# Patient Record
Sex: MALE | Race: WHITE | NOT HISPANIC OR LATINO
[De-identification: names, ages, dates, MRNs, and addresses within clinical notes are randomized per-mention and may not be internally consistent; named-entity substitution may affect disease eponyms.]

---

## 2017-01-25 ENCOUNTER — APPOINTMENT (OUTPATIENT)
Dept: INTERNAL MEDICINE | Facility: CLINIC | Age: 72
End: 2017-01-25

## 2017-01-25 VITALS
TEMPERATURE: 98.1 F | WEIGHT: 238 LBS | OXYGEN SATURATION: 98 % | BODY MASS INDEX: 29.59 KG/M2 | DIASTOLIC BLOOD PRESSURE: 76 MMHG | HEART RATE: 57 BPM | HEIGHT: 75 IN | SYSTOLIC BLOOD PRESSURE: 130 MMHG

## 2017-03-21 ENCOUNTER — RX RENEWAL (OUTPATIENT)
Age: 72
End: 2017-03-21

## 2017-03-24 ENCOUNTER — APPOINTMENT (OUTPATIENT)
Dept: OTOLARYNGOLOGY | Facility: CLINIC | Age: 72
End: 2017-03-24

## 2017-03-24 VITALS
BODY MASS INDEX: 29.59 KG/M2 | HEART RATE: 81 BPM | WEIGHT: 238 LBS | DIASTOLIC BLOOD PRESSURE: 87 MMHG | HEIGHT: 75 IN | SYSTOLIC BLOOD PRESSURE: 154 MMHG

## 2017-04-21 ENCOUNTER — APPOINTMENT (OUTPATIENT)
Dept: OTOLARYNGOLOGY | Facility: CLINIC | Age: 72
End: 2017-04-21

## 2017-04-21 VITALS
HEART RATE: 67 BPM | HEIGHT: 75 IN | BODY MASS INDEX: 29.59 KG/M2 | DIASTOLIC BLOOD PRESSURE: 71 MMHG | WEIGHT: 238 LBS | SYSTOLIC BLOOD PRESSURE: 144 MMHG

## 2017-05-15 ENCOUNTER — APPOINTMENT (OUTPATIENT)
Dept: OTOLARYNGOLOGY | Facility: CLINIC | Age: 72
End: 2017-05-15

## 2017-05-15 VITALS
HEART RATE: 73 BPM | SYSTOLIC BLOOD PRESSURE: 156 MMHG | BODY MASS INDEX: 29.59 KG/M2 | WEIGHT: 238 LBS | DIASTOLIC BLOOD PRESSURE: 78 MMHG | HEIGHT: 75 IN

## 2017-06-07 ENCOUNTER — APPOINTMENT (OUTPATIENT)
Dept: OTOLARYNGOLOGY | Facility: CLINIC | Age: 72
End: 2017-06-07

## 2017-06-07 VITALS
BODY MASS INDEX: 29.59 KG/M2 | SYSTOLIC BLOOD PRESSURE: 135 MMHG | WEIGHT: 238 LBS | HEART RATE: 90 BPM | HEIGHT: 75 IN | DIASTOLIC BLOOD PRESSURE: 77 MMHG

## 2017-10-14 ENCOUNTER — EMERGENCY (EMERGENCY)
Facility: HOSPITAL | Age: 72
LOS: 1 days | Discharge: HOME | End: 2017-10-14
Attending: EMERGENCY MEDICINE | Admitting: EMERGENCY MEDICINE
Payer: MEDICARE

## 2017-10-14 VITALS
HEART RATE: 82 BPM | SYSTOLIC BLOOD PRESSURE: 179 MMHG | DIASTOLIC BLOOD PRESSURE: 78 MMHG | RESPIRATION RATE: 20 BRPM | OXYGEN SATURATION: 97 % | TEMPERATURE: 98 F

## 2017-10-14 VITALS
SYSTOLIC BLOOD PRESSURE: 150 MMHG | HEART RATE: 62 BPM | TEMPERATURE: 98 F | DIASTOLIC BLOOD PRESSURE: 82 MMHG | RESPIRATION RATE: 17 BRPM | OXYGEN SATURATION: 98 %

## 2017-10-14 DIAGNOSIS — Q62.10 CONGENITAL OCCLUSION OF URETER, UNSPECIFIED: Chronic | ICD-10-CM

## 2017-10-14 LAB
ALBUMIN SERPL ELPH-MCNC: 4.4 G/DL — SIGNIFICANT CHANGE UP (ref 3.3–5)
ALP SERPL-CCNC: 99 U/L — SIGNIFICANT CHANGE UP (ref 40–120)
ALT FLD-CCNC: 25 U/L RC — SIGNIFICANT CHANGE UP (ref 10–45)
ANION GAP SERPL CALC-SCNC: 15 MMOL/L — SIGNIFICANT CHANGE UP (ref 5–17)
APPEARANCE UR: CLEAR — SIGNIFICANT CHANGE UP
AST SERPL-CCNC: 20 U/L — SIGNIFICANT CHANGE UP (ref 10–40)
BASOPHILS # BLD AUTO: 0 K/UL — SIGNIFICANT CHANGE UP (ref 0–0.2)
BILIRUB SERPL-MCNC: 1.4 MG/DL — HIGH (ref 0.2–1.2)
BILIRUB UR-MCNC: NEGATIVE — SIGNIFICANT CHANGE UP
BUN SERPL-MCNC: 25 MG/DL — HIGH (ref 7–23)
CALCIUM SERPL-MCNC: 9.6 MG/DL — SIGNIFICANT CHANGE UP (ref 8.4–10.5)
CHLORIDE SERPL-SCNC: 98 MMOL/L — SIGNIFICANT CHANGE UP (ref 96–108)
CO2 SERPL-SCNC: 24 MMOL/L — SIGNIFICANT CHANGE UP (ref 22–31)
COLOR SPEC: YELLOW — SIGNIFICANT CHANGE UP
CREAT SERPL-MCNC: 2.06 MG/DL — HIGH (ref 0.5–1.3)
DIFF PNL FLD: ABNORMAL
EOSINOPHIL # BLD AUTO: 0.1 K/UL — SIGNIFICANT CHANGE UP (ref 0–0.5)
GAS PNL BLDV: SIGNIFICANT CHANGE UP
GLUCOSE SERPL-MCNC: 248 MG/DL — HIGH (ref 70–99)
GLUCOSE UR QL: >1000
HCT VFR BLD CALC: 45 % — SIGNIFICANT CHANGE UP (ref 39–50)
HGB BLD-MCNC: 16.6 G/DL — SIGNIFICANT CHANGE UP (ref 13–17)
KETONES UR-MCNC: NEGATIVE — SIGNIFICANT CHANGE UP
LEUKOCYTE ESTERASE UR-ACNC: NEGATIVE — SIGNIFICANT CHANGE UP
LYMPHOCYTES # BLD AUTO: 1.4 K/UL — SIGNIFICANT CHANGE UP (ref 1–3.3)
LYMPHOCYTES # BLD AUTO: 9 % — LOW (ref 13–44)
MCHC RBC-ENTMCNC: 33.1 PG — SIGNIFICANT CHANGE UP (ref 27–34)
MCHC RBC-ENTMCNC: 36.8 GM/DL — HIGH (ref 32–36)
MCV RBC AUTO: 89.9 FL — SIGNIFICANT CHANGE UP (ref 80–100)
MONOCYTES # BLD AUTO: 0.6 K/UL — SIGNIFICANT CHANGE UP (ref 0–0.9)
MONOCYTES NFR BLD AUTO: 5 % — SIGNIFICANT CHANGE UP (ref 2–14)
NEUTROPHILS # BLD AUTO: 10.1 K/UL — HIGH (ref 1.8–7.4)
NEUTROPHILS NFR BLD AUTO: 86 % — HIGH (ref 43–77)
NITRITE UR-MCNC: NEGATIVE — SIGNIFICANT CHANGE UP
PH UR: 5.5 — SIGNIFICANT CHANGE UP (ref 5–8)
PLATELET # BLD AUTO: 142 K/UL — LOW (ref 150–400)
POTASSIUM SERPL-MCNC: 4.3 MMOL/L — SIGNIFICANT CHANGE UP (ref 3.5–5.3)
POTASSIUM SERPL-SCNC: 4.3 MMOL/L — SIGNIFICANT CHANGE UP (ref 3.5–5.3)
PROT SERPL-MCNC: 8 G/DL — SIGNIFICANT CHANGE UP (ref 6–8.3)
PROT UR-MCNC: 30 MG/DL
RBC # BLD: 5 M/UL — SIGNIFICANT CHANGE UP (ref 4.2–5.8)
RBC # FLD: 11.4 % — SIGNIFICANT CHANGE UP (ref 10.3–14.5)
RBC CASTS # UR COMP ASSIST: ABNORMAL /HPF (ref 0–2)
SODIUM SERPL-SCNC: 137 MMOL/L — SIGNIFICANT CHANGE UP (ref 135–145)
SP GR SPEC: 1.02 — SIGNIFICANT CHANGE UP (ref 1.01–1.02)
UROBILINOGEN FLD QL: NEGATIVE — SIGNIFICANT CHANGE UP
WBC # BLD: 12.1 K/UL — HIGH (ref 3.8–10.5)
WBC # FLD AUTO: 12.1 K/UL — HIGH (ref 3.8–10.5)
WBC UR QL: SIGNIFICANT CHANGE UP /HPF (ref 0–5)

## 2017-10-14 PROCEDURE — 74176 CT ABD & PELVIS W/O CONTRAST: CPT | Mod: 26

## 2017-10-14 PROCEDURE — 99284 EMERGENCY DEPT VISIT MOD MDM: CPT | Mod: GC

## 2017-10-14 RX ORDER — CEPHALEXIN 500 MG
1 CAPSULE ORAL
Qty: 28 | Refills: 0 | OUTPATIENT
Start: 2017-10-14 | End: 2017-10-21

## 2017-10-14 RX ORDER — OXYCODONE HYDROCHLORIDE 5 MG/1
1 TABLET ORAL
Qty: 8 | Refills: 0 | OUTPATIENT
Start: 2017-10-14 | End: 2017-10-16

## 2017-10-14 RX ORDER — CEPHALEXIN 500 MG
500 CAPSULE ORAL ONCE
Qty: 0 | Refills: 0 | Status: COMPLETED | OUTPATIENT
Start: 2017-10-14 | End: 2017-10-14

## 2017-10-14 RX ORDER — OXYCODONE HYDROCHLORIDE 5 MG/1
5 TABLET ORAL ONCE
Qty: 0 | Refills: 0 | Status: DISCONTINUED | OUTPATIENT
Start: 2017-10-14 | End: 2017-10-14

## 2017-10-14 RX ORDER — SODIUM CHLORIDE 9 MG/ML
1000 INJECTION INTRAMUSCULAR; INTRAVENOUS; SUBCUTANEOUS ONCE
Qty: 0 | Refills: 0 | Status: COMPLETED | OUTPATIENT
Start: 2017-10-14 | End: 2017-10-14

## 2017-10-14 RX ORDER — ACETAMINOPHEN 500 MG
975 TABLET ORAL ONCE
Qty: 0 | Refills: 0 | Status: COMPLETED | OUTPATIENT
Start: 2017-10-14 | End: 2017-10-14

## 2017-10-14 RX ADMIN — OXYCODONE HYDROCHLORIDE 5 MILLIGRAM(S): 5 TABLET ORAL at 05:37

## 2017-10-14 RX ADMIN — OXYCODONE HYDROCHLORIDE 5 MILLIGRAM(S): 5 TABLET ORAL at 02:00

## 2017-10-14 RX ADMIN — SODIUM CHLORIDE 4000 MILLILITER(S): 9 INJECTION INTRAMUSCULAR; INTRAVENOUS; SUBCUTANEOUS at 02:03

## 2017-10-14 RX ADMIN — Medication 500 MILLIGRAM(S): at 05:48

## 2017-10-14 RX ADMIN — Medication 975 MILLIGRAM(S): at 01:58

## 2017-10-14 RX ADMIN — Medication 975 MILLIGRAM(S): at 05:37

## 2017-10-14 NOTE — ED PROVIDER NOTE - RESPIRATORY, MLM
Breath sounds clear and equal bilaterally. **ATTENDING ADDENDUM (Dr. Kendell Vega): NO wheezing, rales, rhonchi, crackles, stridor, drooling, retractions, nasal flaring, or tripoding.

## 2017-10-14 NOTE — ED PROVIDER NOTE - MEDICAL DECISION MAKING DETAILS
Likely another kidney stone, nontoxic appearing, no signs of systemic infection. Plan: ua with culture, ct stone hunt, pain control, labs Likely another kidney stone, nontoxic appearing, no signs of systemic infection. Plan: ua with culture, ct stone hunt, pain control, labs  **ATTENDING MEDICAL DECISION MAKING/SYNTHESIS (Dr. Kendell Vega): I have reviewed the Chief Complaint, the HPI, the ROS, and have directly performed and confirmed the findings on the Physical Examination. I have reviewed the medical decision making with all providers, as applicable. The PROBLEM REPRESENTATION at this time is: 71-year-old man with prior history of obstructing ureteral stones now presenting with two days of acute left-sided flank pain (dull, achy) and nausea. The MOST LIKELY DIAGNOSIS, and the LIST OF DIFFERENTIAL DIAGNOSES, includes (but is not limited to) the following: obstructing ureteral stone with or without pyelonephritis, serious bacterial infection or sepsis/severe sepsis e.g. urinary tract infection or pyelonephritis with or without obstructing ureteral stone, vascular etiology e.g. AAA, dissection, or equivalent, surgical etiology for pain e.g. small bowel obstruction,. large bowel obstruction, diverticulitis, perforation, peritonitis, or equivalent, electrolyte-metabolic-endocrine derangements, dehydration. The likelihood of each of these diagnoses has been appropriately considered in the context of this patient's presentation and my evaluation. PLAN: as described in EMR, including diagnostics, therapeutics and consultation as clinically warranted. I will continue to reevaluate the patient, including the results of all testing, and monitor response to therapy throughout the patient's course in the ED.

## 2017-10-14 NOTE — ED PROVIDER NOTE - OBJECTIVE STATEMENT
Patient is 71 y M with PMH IDDM2 on metformin, htn, multiple kidney stones s/p lithotripsy, melanoma in remission presenting with dull constant left flank pain x 2 days consistent with previous kidney stones, has been having trouble sleeping, some nausea no vomiting.     PMD: Marck Hernandez  Uro: Kendell Metz  ROS: Denies fever, palpitations, chills, recent sickness, HA, vision changes, cough, SOB, chest pain, n/v/d/c, dysuria, hematuria, rash, new joint aches, sick contacts, and recent travel. Patient is 71 y M with PMH IDDM2 on metformin, htn, multiple kidney stones s/p lithotripsy, melanoma in remission presenting with dull constant left flank pain x 2 days consistent with previous kidney stones, has been having trouble sleeping, some nausea no vomiting.   PMD: Marck Hernandez  Uro: Kendell Metz  ROS: Denies fever, palpitations, chills, recent sickness, HA, vision changes, cough, SOB, chest pain, n/v/d/c, dysuria, hematuria, rash, new joint aches, sick contacts, and recent travel.  **ATTENDING ADDENDUM (Dr. Kendell Vega): I attest that I have directly and personally interviewed and examined this patient and elicited a comparable history of present illness and review of systems as documented, along with my EM resident. I attest that I have made significant contributions to the documentation where necessary and as noted in the EMR.

## 2017-10-14 NOTE — ED PROVIDER NOTE - ATTENDING CONTRIBUTION TO CARE
**ATTENDING ADDENDUM (Dr. Kendell Vega): I attest that I have directly examined this patient and reviewed and formulated the diagnostic and therapeutic management plan in collaboration with the EM resident. Please see MDM note and remainder of EMR for findings from CC, HPI, ROS, and PE. (Omari)

## 2017-10-14 NOTE — ED ADULT NURSE NOTE - OBJECTIVE STATEMENT
70 y/o male hx of DM, HTN and kidney stones came in c/o left flank pain 7/10 pain scale. Pt denies chest pain or SOB, no nausea or vomiting, no fever/chills. Abdomen is rigid which is baseline as per pt. non tender t touch. Denies any urinary symptoms. Safety maintained & continue monitor.

## 2017-10-14 NOTE — CONSULT NOTE ADULT - SUBJECTIVE AND OBJECTIVE BOX
Urology PA Consult Note    HPI:    71 y.o. M with PMHx of nephrolithiasis s/p lithotripsy and stent placement, DM presenting with sudden onset of dull, constant L flank pan x 2 days. Pt reports discomfort is similar to previous kidney stones, though states pain is dull and not as intense. Pt admits to nausea but denies fevers/chills, vomiting, hematuria, dysuria, urinary urgency/frequency, chest pain or SOB.      In ED pt paint controlled after receiving 975mg oral tylenol and 5mg oxycodone.      PAST MEDICAL & SURGICAL HISTORY:  Kidney stone  Hypertension  History of Renal Calculi  History of Melanoma  NIDDM (Non-Insulin Dependent Diabetes Mellitus)  Ureteral stenosis: with stent placement  Nephrolithiasis: stent placement      MEDICATIONS  (STANDING):    MEDICATIONS  (PRN):    FAMILY HISTORY:    Allergies  glipiZIDE (Unknown)    Intolerances    REVIEW OF SYSTEMS: Pertinent positives and negatives as stated in HPI, otherwise negative    Vital signs  T(C): 37.1 (10-14-17 @ 02:27), Max: 37.1 (10-14-17 @ 02:27)  HR: 59 (10-14-17 @ 03:56)  BP: 146/77 (10-14-17 @ 03:56)  SpO2: 97% (10-14-17 @ 03:56)  Wt(kg): --    Output  I&O's Summary    UOP    Physical Exam  Gen: NAD, A&O x 3  Abd: obese, soft, non tender  Back: No CVA tenderness b/l  : voiding, wnl  Ext: No edema present b/l    LABS:  10-14 @ 01:56    WBC 12.1  / Hct 45.0  / SCr 2.06     10-14    137  |  98  |  25<H>  ----------------------------<  248<H>  4.3   |  24  |  2.06<H>    Ca    9.6      14 Oct 2017 01:56    TPro  8.0  /  Alb  4.4  /  TBili  1.4<H>  /  DBili  x   /  AST  20  /  ALT  25  /  AlkPhos  99  10-14      Urinalysis Basic - ( 14 Oct 2017 02:14 )    Color: Yellow / Appearance: Clear / S.017 / pH: x  Gluc: x / Ketone: Negative  / Bili: Negative / Urobili: Negative   Blood: x / Protein: 30 mg/dL / Nitrite: Negative   Leuk Esterase: Negative / RBC: 25-50 /HPF / WBC 3-5 /HPF   Sq Epi: x / Non Sq Epi: x / Bacteria: x        Cultures: Urine culture pending    RADIOLOGY:  EXAM:  CT ABDOMEN AND PELVIS                        PROCEDURE DATE:  10/14/2017    NTERPRETATION:  CLINICAL INFORMATION: Left flank pain. History of renal   stones.  COMPARISON: CT the abdomen pelvis from 2016.  PROCEDURE: CT of the Abdomen and Pelvis was performed without intravenous   contrast in the prone position.  Intravenous contrast: None.  Oral contrast: None.  Sagittal and coronal reformats were performed.    FINDINGS:    LOWER CHEST: Within normal limits.    LIVER: Within normal limits.  BILE DUCTS: Normal caliber.  GALLBLADDER: Normal limits.  SPLEEN: Within normal limits.  PANCREAS: Within normal limits.  ADRENALS: Within normal limits.  KIDNEYS/URETERS: Bilateral simple cysts. Mild left hydroureteronephrosis   to the midportion of the left ureter, where there are a couple of stones   measuring up to 8 mm. Additional bilateral nonobstructive intrarenal   calculi in the left lower pole measure up to 5 mm.    BLADDER: Unchanged bladder wall thickening.  REPRODUCTIVE ORGANS: Prostate is mildly enlarged. Seminal vesicles are   unremarkable.    BOWEL: No obstruction or abnormal bowel thickening. Normal appendix.  PERITONEUM: No free air or fluid. Unchanged left upper quadrant   mesenteric lymph nodes with surrounding fat stranding. No free air or   fluid collection.  VESSELS:  No abdominal aortic aneurysm.  RETROPERITONEUM: No lymphadenopathy.    ABDOMINAL WALL: Within normal limits.  BONES: Chronic mild L2 compression fracture without bony retropulsion.   Degenerative changes.    IMPRESSION:  Mild left hydroureteronephrosis and perinephric fat stranding with couple   stones in the mid ureter measuring up to 8mm. Additional bilateral   nonobstructing intrarenal calculi

## 2017-10-14 NOTE — ED PROVIDER NOTE - CARE PLAN
Principal Discharge DX:	Kidney stone Principal Discharge DX:	Kidney stone  Goal:	ATTENDING ADDENDUM (Dr. Kendell Vega): Goals of care include resolution of emergent/urgent symptoms and concerns, and restoration to baseline level of homeostasis.  Instructions for follow-up, activity and diet:	ATTENDING ADDENDUM (Dr. Kendell Vega): (1) anticipatory guidance provided  (2) rest  (3) outpatient follow-up with your primary care physician/provider (4) return if symptoms worsen, persist, or do not resolve (5) medications, if indicated, as prescribed

## 2017-10-14 NOTE — ED PROVIDER NOTE - ABDOMINAL EXAM
no organomegaly/soft/nondistended/**ATTENDING ADDENDUM (Dr. Kendell Vega): NO guarding, rebound, or rigidity. NO pulsatile or non-pulsatile masses. NO hernias. NO obvious hepatosplenomegaly./tender...

## 2017-10-14 NOTE — ED ADULT NURSE NOTE - PMH
History of Melanoma    History of Renal Calculi    Hypertension    Kidney stone    NIDDM (Non-Insulin Dependent Diabetes Mellitus)

## 2017-10-14 NOTE — ED PROVIDER NOTE - CONDUCTED A DETAILED DISCUSSION WITH PATIENT AND/OR GUARDIAN REGARDING, MDM
radiology results/return to ED if symptoms worsen, persist or questions arise/need for outpatient follow-up/**ATTENDING ADDENDUM (Dr. Kendell Vega): Anticipatory guidance provided./lab results

## 2017-10-14 NOTE — ED PROVIDER NOTE - ABDOMINAL TENDER
left lower quadrant/**ATTENDING ADDENDUM (Dr. Kendell Vega): left flank tenderness with radiation into the groin.

## 2017-10-14 NOTE — ED PROVIDER NOTE - EYES, MLM
**ATTENDING ADDENDUM (Dr. Kendell Vega): Extraocular muscle movements intact. Clear corneas bilaterally, pupils equal and round. NO nystagmus.

## 2017-10-14 NOTE — ED PROVIDER NOTE - GENITOURINARY [+], MLM
**ATTENDING ADDENDUM (Dr. Kendell Vega): POSITIVE prior history of renal colic (multiple episodes in the past)

## 2017-10-14 NOTE — ED PROVIDER NOTE - PLAN OF CARE
ATTENDING ADDENDUM (Dr. Kendell Vega): Goals of care include resolution of emergent/urgent symptoms and concerns, and restoration to baseline level of homeostasis. ATTENDING ADDENDUM (Dr. Kendell Vega): (1) anticipatory guidance provided  (2) rest  (3) outpatient follow-up with your primary care physician/provider (4) return if symptoms worsen, persist, or do not resolve (5) medications, if indicated, as prescribed

## 2017-10-14 NOTE — ED PROVIDER NOTE - SKIN, MLM
Skin normal color for race, warm, dry and intact. **ATTENDING ADDENDUM (Dr. Kendell Vega): NO rashes, lesions, vesicles, cellulitis, petechiae, purpurae, track marks or ecchymoses.

## 2017-10-14 NOTE — ED PROVIDER NOTE - MUSCULOSKELETAL, MLM
Spine appears normal, range of motion is not limited, no muscle or joint tenderness **ATTENDING ADDENDUM (Dr. Kendell Vega): NO costovertebral angle tenderness.

## 2017-10-14 NOTE — CONSULT NOTE ADULT - ASSESSMENT
71 y.o. M with multiple stones in Left mid ureter measuring up to 8mm with mild hydronephrosis  - Hydration, oral  - pain medication  - strain urine  - Flomax  - Keflex   - follow up with Dr. Metz in office as soon as possible.  - discussed with Dr. Bentley, on- call urologist

## 2017-10-14 NOTE — ED PROVIDER NOTE - PHYSICAL EXAMINATION
**ATTENDING ADDENDUM (Dr. Kendell Vega): I have reviewed and substantially contributed to the elements of the PE as documented above. I have directly performed an examination of this patient in conjunction with the other members (EM resident/PA/NP) of the patient care team.

## 2017-10-15 LAB
CULTURE RESULTS: SIGNIFICANT CHANGE UP
SPECIMEN SOURCE: SIGNIFICANT CHANGE UP

## 2017-10-17 ENCOUNTER — INPATIENT (INPATIENT)
Facility: HOSPITAL | Age: 72
LOS: 1 days | Discharge: HOME | DRG: 694 | End: 2017-10-19
Attending: UROLOGY | Admitting: UROLOGY
Payer: MEDICARE

## 2017-10-17 ENCOUNTER — APPOINTMENT (OUTPATIENT)
Dept: RADIOLOGY | Facility: CLINIC | Age: 72
End: 2017-10-17
Payer: MEDICARE

## 2017-10-17 ENCOUNTER — OUTPATIENT (OUTPATIENT)
Dept: OUTPATIENT SERVICES | Facility: HOSPITAL | Age: 72
LOS: 1 days | End: 2017-10-17

## 2017-10-17 VITALS
SYSTOLIC BLOOD PRESSURE: 152 MMHG | RESPIRATION RATE: 18 BRPM | DIASTOLIC BLOOD PRESSURE: 91 MMHG | HEART RATE: 60 BPM | TEMPERATURE: 99 F | OXYGEN SATURATION: 95 % | WEIGHT: 233.03 LBS

## 2017-10-17 DIAGNOSIS — N20.0 CALCULUS OF KIDNEY: ICD-10-CM

## 2017-10-17 DIAGNOSIS — N20.1 CALCULUS OF URETER: ICD-10-CM

## 2017-10-17 DIAGNOSIS — Q62.10 CONGENITAL OCCLUSION OF URETER, UNSPECIFIED: Chronic | ICD-10-CM

## 2017-10-17 DIAGNOSIS — E11.22 TYPE 2 DIABETES MELLITUS WITH DIABETIC CHRONIC KIDNEY DISEASE: ICD-10-CM

## 2017-10-17 DIAGNOSIS — Z29.9 ENCOUNTER FOR PROPHYLACTIC MEASURES, UNSPECIFIED: ICD-10-CM

## 2017-10-17 DIAGNOSIS — I10 ESSENTIAL (PRIMARY) HYPERTENSION: ICD-10-CM

## 2017-10-17 LAB
ALBUMIN SERPL ELPH-MCNC: 4.5 G/DL — SIGNIFICANT CHANGE UP (ref 3.3–5)
ALP SERPL-CCNC: 85 U/L — SIGNIFICANT CHANGE UP (ref 40–120)
ALT FLD-CCNC: 24 U/L RC — SIGNIFICANT CHANGE UP (ref 10–45)
ANION GAP SERPL CALC-SCNC: 12 MMOL/L — SIGNIFICANT CHANGE UP (ref 5–17)
APTT BLD: 30.9 SEC — SIGNIFICANT CHANGE UP (ref 27.5–37.4)
AST SERPL-CCNC: 20 U/L — SIGNIFICANT CHANGE UP (ref 10–40)
BASOPHILS # BLD AUTO: 0.1 K/UL — SIGNIFICANT CHANGE UP (ref 0–0.2)
BASOPHILS NFR BLD AUTO: 0.7 % — SIGNIFICANT CHANGE UP (ref 0–2)
BILIRUB SERPL-MCNC: 1.1 MG/DL — SIGNIFICANT CHANGE UP (ref 0.2–1.2)
BLD GP AB SCN SERPL QL: NEGATIVE — SIGNIFICANT CHANGE UP
BUN SERPL-MCNC: 33 MG/DL — HIGH (ref 7–23)
CALCIUM SERPL-MCNC: 9.8 MG/DL — SIGNIFICANT CHANGE UP (ref 8.4–10.5)
CHLORIDE SERPL-SCNC: 96 MMOL/L — SIGNIFICANT CHANGE UP (ref 96–108)
CO2 SERPL-SCNC: 30 MMOL/L — SIGNIFICANT CHANGE UP (ref 22–31)
CREAT SERPL-MCNC: 2.4 MG/DL — HIGH (ref 0.5–1.3)
EOSINOPHIL # BLD AUTO: 0.1 K/UL — SIGNIFICANT CHANGE UP (ref 0–0.5)
EOSINOPHIL NFR BLD AUTO: 1.2 % — SIGNIFICANT CHANGE UP (ref 0–6)
GLUCOSE BLDC GLUCOMTR-MCNC: 274 MG/DL — HIGH (ref 70–99)
GLUCOSE SERPL-MCNC: 117 MG/DL — HIGH (ref 70–99)
HCT VFR BLD CALC: 43.2 % — SIGNIFICANT CHANGE UP (ref 39–50)
HGB BLD-MCNC: 15.5 G/DL — SIGNIFICANT CHANGE UP (ref 13–17)
INR BLD: 1.17 RATIO — HIGH (ref 0.88–1.16)
LYMPHOCYTES # BLD AUTO: 2.2 K/UL — SIGNIFICANT CHANGE UP (ref 1–3.3)
LYMPHOCYTES # BLD AUTO: 23.3 % — SIGNIFICANT CHANGE UP (ref 13–44)
MCHC RBC-ENTMCNC: 32.6 PG — SIGNIFICANT CHANGE UP (ref 27–34)
MCHC RBC-ENTMCNC: 36 GM/DL — SIGNIFICANT CHANGE UP (ref 32–36)
MCV RBC AUTO: 90.7 FL — SIGNIFICANT CHANGE UP (ref 80–100)
MONOCYTES # BLD AUTO: 0.7 K/UL — SIGNIFICANT CHANGE UP (ref 0–0.9)
MONOCYTES NFR BLD AUTO: 7.3 % — SIGNIFICANT CHANGE UP (ref 2–14)
NEUTROPHILS # BLD AUTO: 6.2 K/UL — SIGNIFICANT CHANGE UP (ref 1.8–7.4)
NEUTROPHILS NFR BLD AUTO: 67.5 % — SIGNIFICANT CHANGE UP (ref 43–77)
PLATELET # BLD AUTO: 157 K/UL — SIGNIFICANT CHANGE UP (ref 150–400)
POTASSIUM SERPL-MCNC: 4.4 MMOL/L — SIGNIFICANT CHANGE UP (ref 3.5–5.3)
POTASSIUM SERPL-SCNC: 4.4 MMOL/L — SIGNIFICANT CHANGE UP (ref 3.5–5.3)
PROT SERPL-MCNC: 8.3 G/DL — SIGNIFICANT CHANGE UP (ref 6–8.3)
PROTHROM AB SERPL-ACNC: 12.7 SEC — SIGNIFICANT CHANGE UP (ref 9.8–12.7)
RBC # BLD: 4.77 M/UL — SIGNIFICANT CHANGE UP (ref 4.2–5.8)
RBC # FLD: 11.2 % — SIGNIFICANT CHANGE UP (ref 10.3–14.5)
RH IG SCN BLD-IMP: POSITIVE — SIGNIFICANT CHANGE UP
SODIUM SERPL-SCNC: 138 MMOL/L — SIGNIFICANT CHANGE UP (ref 135–145)
WBC # BLD: 9.2 K/UL — SIGNIFICANT CHANGE UP (ref 3.8–10.5)
WBC # FLD AUTO: 9.2 K/UL — SIGNIFICANT CHANGE UP (ref 3.8–10.5)

## 2017-10-17 PROCEDURE — 99284 EMERGENCY DEPT VISIT MOD MDM: CPT | Mod: 25,GC

## 2017-10-17 PROCEDURE — 99223 1ST HOSP IP/OBS HIGH 75: CPT

## 2017-10-17 PROCEDURE — 93010 ELECTROCARDIOGRAM REPORT: CPT

## 2017-10-17 PROCEDURE — 71010: CPT | Mod: 26

## 2017-10-17 PROCEDURE — 74000: CPT | Mod: 26

## 2017-10-17 RX ORDER — OXYCODONE HYDROCHLORIDE 5 MG/1
10 TABLET ORAL EVERY 6 HOURS
Qty: 0 | Refills: 0 | Status: DISCONTINUED | OUTPATIENT
Start: 2017-10-17 | End: 2017-10-19

## 2017-10-17 RX ORDER — DOCUSATE SODIUM 100 MG
100 CAPSULE ORAL THREE TIMES A DAY
Qty: 0 | Refills: 0 | Status: DISCONTINUED | OUTPATIENT
Start: 2017-10-17 | End: 2017-10-19

## 2017-10-17 RX ORDER — HEPARIN SODIUM 5000 [USP'U]/ML
5000 INJECTION INTRAVENOUS; SUBCUTANEOUS EVERY 12 HOURS
Qty: 0 | Refills: 0 | Status: DISCONTINUED | OUTPATIENT
Start: 2017-10-17 | End: 2017-10-19

## 2017-10-17 RX ORDER — INDAPAMIDE 1.25 MG
0 TABLET ORAL
Qty: 0 | Refills: 0 | COMMUNITY

## 2017-10-17 RX ORDER — TAMSULOSIN HYDROCHLORIDE 0.4 MG/1
0.4 CAPSULE ORAL DAILY
Qty: 0 | Refills: 0 | Status: DISCONTINUED | OUTPATIENT
Start: 2017-10-17 | End: 2017-10-19

## 2017-10-17 RX ORDER — INSULIN LISPRO 100/ML
VIAL (ML) SUBCUTANEOUS
Qty: 0 | Refills: 0 | Status: DISCONTINUED | OUTPATIENT
Start: 2017-10-17 | End: 2017-10-18

## 2017-10-17 RX ORDER — SENNA PLUS 8.6 MG/1
2 TABLET ORAL AT BEDTIME
Qty: 0 | Refills: 0 | Status: DISCONTINUED | OUTPATIENT
Start: 2017-10-17 | End: 2017-10-19

## 2017-10-17 RX ORDER — INSULIN DETEMIR 100/ML (3)
15 INSULIN PEN (ML) SUBCUTANEOUS AT BEDTIME
Qty: 0 | Refills: 0 | Status: DISCONTINUED | OUTPATIENT
Start: 2017-10-17 | End: 2017-10-17

## 2017-10-17 RX ORDER — ACETAMINOPHEN 500 MG
650 TABLET ORAL EVERY 6 HOURS
Qty: 0 | Refills: 0 | Status: DISCONTINUED | OUTPATIENT
Start: 2017-10-17 | End: 2017-10-19

## 2017-10-17 RX ORDER — OXYCODONE HYDROCHLORIDE 5 MG/1
5 TABLET ORAL EVERY 4 HOURS
Qty: 0 | Refills: 0 | Status: DISCONTINUED | OUTPATIENT
Start: 2017-10-17 | End: 2017-10-19

## 2017-10-17 RX ORDER — ONDANSETRON 8 MG/1
4 TABLET, FILM COATED ORAL EVERY 6 HOURS
Qty: 0 | Refills: 0 | Status: DISCONTINUED | OUTPATIENT
Start: 2017-10-17 | End: 2017-10-19

## 2017-10-17 RX ORDER — INSULIN GLARGINE 100 [IU]/ML
15 INJECTION, SOLUTION SUBCUTANEOUS AT BEDTIME
Qty: 0 | Refills: 0 | Status: DISCONTINUED | OUTPATIENT
Start: 2017-10-17 | End: 2017-10-19

## 2017-10-17 RX ORDER — METOPROLOL TARTRATE 50 MG
50 TABLET ORAL
Qty: 0 | Refills: 0 | Status: DISCONTINUED | OUTPATIENT
Start: 2017-10-17 | End: 2017-10-19

## 2017-10-17 RX ORDER — SODIUM CHLORIDE 9 MG/ML
1000 INJECTION, SOLUTION INTRAVENOUS
Qty: 0 | Refills: 0 | Status: DISCONTINUED | OUTPATIENT
Start: 2017-10-17 | End: 2017-10-19

## 2017-10-17 RX ORDER — INSULIN LISPRO 100/ML
VIAL (ML) SUBCUTANEOUS AT BEDTIME
Qty: 0 | Refills: 0 | Status: DISCONTINUED | OUTPATIENT
Start: 2017-10-17 | End: 2017-10-18

## 2017-10-17 RX ORDER — GLUCAGON INJECTION, SOLUTION 0.5 MG/.1ML
1 INJECTION, SOLUTION SUBCUTANEOUS ONCE
Qty: 0 | Refills: 0 | Status: DISCONTINUED | OUTPATIENT
Start: 2017-10-17 | End: 2017-10-19

## 2017-10-17 RX ORDER — INDAPAMIDE 1.25 MG
1.25 TABLET ORAL DAILY
Qty: 0 | Refills: 0 | Status: DISCONTINUED | OUTPATIENT
Start: 2017-10-17 | End: 2017-10-19

## 2017-10-17 RX ORDER — OXYCODONE AND ACETAMINOPHEN 5; 325 MG/1; MG/1
1 TABLET ORAL EVERY 4 HOURS
Qty: 0 | Refills: 0 | Status: DISCONTINUED | OUTPATIENT
Start: 2017-10-17 | End: 2017-10-17

## 2017-10-17 RX ORDER — OXYCODONE AND ACETAMINOPHEN 5; 325 MG/1; MG/1
2 TABLET ORAL EVERY 6 HOURS
Qty: 0 | Refills: 0 | Status: DISCONTINUED | OUTPATIENT
Start: 2017-10-17 | End: 2017-10-17

## 2017-10-17 RX ORDER — DEXTROSE 50 % IN WATER 50 %
1 SYRINGE (ML) INTRAVENOUS ONCE
Qty: 0 | Refills: 0 | Status: DISCONTINUED | OUTPATIENT
Start: 2017-10-17 | End: 2017-10-19

## 2017-10-17 RX ORDER — DEXTROSE 50 % IN WATER 50 %
12.5 SYRINGE (ML) INTRAVENOUS ONCE
Qty: 0 | Refills: 0 | Status: DISCONTINUED | OUTPATIENT
Start: 2017-10-17 | End: 2017-10-19

## 2017-10-17 RX ORDER — ATORVASTATIN CALCIUM 80 MG/1
20 TABLET, FILM COATED ORAL AT BEDTIME
Qty: 0 | Refills: 0 | Status: DISCONTINUED | OUTPATIENT
Start: 2017-10-17 | End: 2017-10-19

## 2017-10-17 RX ORDER — VALSARTAN 80 MG/1
160 TABLET ORAL DAILY
Qty: 0 | Refills: 0 | Status: DISCONTINUED | OUTPATIENT
Start: 2017-10-17 | End: 2017-10-17

## 2017-10-17 RX ORDER — SODIUM CHLORIDE 9 MG/ML
1000 INJECTION INTRAMUSCULAR; INTRAVENOUS; SUBCUTANEOUS
Qty: 0 | Refills: 0 | Status: DISCONTINUED | OUTPATIENT
Start: 2017-10-17 | End: 2017-10-19

## 2017-10-17 RX ADMIN — Medication 100 MILLIGRAM(S): at 22:50

## 2017-10-17 RX ADMIN — Medication 1: at 22:51

## 2017-10-17 RX ADMIN — HEPARIN SODIUM 5000 UNIT(S): 5000 INJECTION INTRAVENOUS; SUBCUTANEOUS at 20:11

## 2017-10-17 RX ADMIN — TAMSULOSIN HYDROCHLORIDE 0.4 MILLIGRAM(S): 0.4 CAPSULE ORAL at 22:50

## 2017-10-17 RX ADMIN — ATORVASTATIN CALCIUM 20 MILLIGRAM(S): 80 TABLET, FILM COATED ORAL at 22:50

## 2017-10-17 RX ADMIN — Medication 50 MILLIGRAM(S): at 20:10

## 2017-10-17 RX ADMIN — INSULIN GLARGINE 15 UNIT(S): 100 INJECTION, SOLUTION SUBCUTANEOUS at 22:52

## 2017-10-17 NOTE — H&P ADULT - NSHPLABSRESULTS_GEN_ALL_CORE
< from: CT Abdomen and Pelvis No Cont (10.14.17 @ 03:43) >      IMPRESSION:  Mild left hydroureteronephrosis and perinephric fat stranding with couple   stones in the mid ureter measuring up to 8mm. Additional bilateral   nonobstructing intrarenal calculi.    < end of copied text >

## 2017-10-17 NOTE — H&P ADULT - ATTENDING COMMENTS
as above  admitted for pain control and ureteroscpy-rising creat and DM  for left uscope/laser litho  d/w pt-rationale, risk alternative reviewed all quesitons answered

## 2017-10-17 NOTE — CONSULT NOTE ADULT - ASSESSMENT
71M w/DM2, CKD 3, nephrolithiasis, HTN, melanoma in remission a/w recurrent nephrolithiasis planned for lithotripsy

## 2017-10-17 NOTE — H&P ADULT - NSHPPHYSICALEXAM_GEN_ALL_CORE
Vital Signs Last 24 Hrs  T(C): 37 (17 Oct 2017 14:56), Max: 37 (17 Oct 2017 14:56)  T(F): 98.6 (17 Oct 2017 14:56), Max: 98.6 (17 Oct 2017 14:56)  HR: 60 (17 Oct 2017 14:56) (60 - 60)  BP: 152/91 (17 Oct 2017 14:56) (152/91 - 152/91)  BP(mean): --  RR: 18 (17 Oct 2017 14:56) (18 - 18)  SpO2: 95% (17 Oct 2017 14:56) (95% - 95%)    Gen: Nad  Heart: RRR  Chest: CTA  ABdomen: soft NT ND  Back: No CVAT  Ext: no edema

## 2017-10-17 NOTE — ED ADULT NURSE NOTE - OBJECTIVE STATEMENT
71y m pt reports sent to ed tba for another kidney stone; pt has hx of 4 lithotripsies previously;  pt aox3; no resp distress; no chest pain; no sob; no abd pain; + r flank pain and cva tenderness; pt denies fever/chills; no nausea/vomiting; abd soft nontender nondistended; + bs x 4; pt states dec po; skin warm dry intact; wife at bedside; iv placed; labs drawn per md order; adv pt need urine sample; no dysuria; no hematuria; no blood in stool

## 2017-10-17 NOTE — ED ADULT NURSE REASSESSMENT NOTE - NS ED NURSE REASSESS COMMENT FT1
received report from Yaneli DAVENPORT. Patient resting in bed, provided with food. Patient to be NPO after meal as per Surgical team. Safety and comfort maintained.

## 2017-10-17 NOTE — CONSULT NOTE ADULT - SUBJECTIVE AND OBJECTIVE BOX
Dr Serafin Muñoz     PMD: Ravinder Hernandez    71M who presents with a chief complaint of flank pain (17 Oct 2017 17:11)    HPI:  71M with DM2 htn, multiple kidney stones s/p lithotripsy x3 prior episodes, melanoma in remission x 8 yrs presenting with dull constant left flank pain x 4 days consistent with previous kidney stones, has been having trouble sleeping, some nausea no vomiting.  Found a few days ago to have two stones in midureter, largest 8mm. Plan for lithotripsy in AM, asked for preop.    Presently pt has L sided dull flank pain. N/V resolved. No constipation or diarrhea. Normal appetite. No fever/chills. No CP or SOB.     Pain Symptoms if applicable:  Pain:	mild/moderate  Location:	L flank  Modifying factors: worse w/movement	  Associated symptoms:	prior N/V    Function: [X] Independent  [ ] Assistance  [ ] Total care  [ ] Non-ambulatory  On antiplatelet or anticoagulation? [ ] YES [X] NO  Prior anesthesia: had prior nausea after anesthesia  DASI: 34.7 METS: 7      Allergies    glipiZIDE (Unknown)    Intolerances        HOME MEDICATIONS: [X] Reviewed  Home Medications:  Diovan: 160 milligram(s) orally once a day (17 Oct 2017 15:05)  indapamide 2.5 mg oral tablet: 1.25  orally once a day (17 Oct 2017 16:59)  insulin:   Levemir 30 QHS (17 Oct 2017 15:05)  metoprolol: 50 milligram(s) orally 2 times a day (17 Oct 2017 15:05)  repaglinide 2 mg oral tablet: 1 tab(s) orally 3 times a day (17 Oct 2017 15:05)    MEDICATIONS  (STANDING):  dextrose 5%. 1000 milliLiter(s) (50 mL/Hr) IV Continuous <Continuous>  dextrose 50% Injectable 12.5 Gram(s) IV Push once  docusate sodium 100 milliGRAM(s) Oral three times a day  heparin  Injectable 5000 Unit(s) SubCutaneous every 12 hours  indapamide 1.25 milliGRAM(s) Oral daily  insulin lispro (HumaLOG) corrective regimen sliding scale   SubCutaneous three times a day before meals  metoprolol 50 milliGRAM(s) Oral two times a day  sodium chloride 0.9%. 1000 milliLiter(s) (100 mL/Hr) IV Continuous <Continuous>  tamsulosin 0.4 milliGRAM(s) Oral daily  valsartan 160 milliGRAM(s) Oral daily    MEDICATIONS  (PRN):  acetaminophen   Tablet. 650 milliGRAM(s) Oral every 6 hours PRN Mild Pain (1 - 3)  dextrose Gel 1 Dose(s) Oral once PRN Blood Glucose LESS THAN 70 milliGRAM(s)/deciliter  glucagon  Injectable 1 milliGRAM(s) IntraMuscular once PRN Glucose LESS THAN 70 milligrams/deciliter  ondansetron Injectable 4 milliGRAM(s) IV Push every 6 hours PRN Nausea and/or Vomiting  oxyCODONE    5 mG/acetaminophen 325 mG 1 Tablet(s) Oral every 4 hours PRN Moderate Pain (4 - 6)  oxyCODONE    5 mG/acetaminophen 325 mG 2 Tablet(s) Oral every 6 hours PRN Severe Pain (7 - 10)  senna 2 Tablet(s) Oral at bedtime PRN Constipation      PAST MEDICAL & SURGICAL HISTORY:  IDDM (insulin dependent diabetes mellitus)  Kidney stone  Hypertension  History of Renal Calculi  History of Melanoma in remission   NIDDM (Non-Insulin Dependent Diabetes Mellitus): now uses insulin  Ureteral stenosis: with stent placement  Nephrolithiasis: stent placement  Mohs surgery on head  Lumbar discectomy    [X] Reviewed     SOCIAL HISTORY:  Residence: [ ] [facility]  [ ] SNF  [X] Community  [ ] Substance abuse: none  [ ] Tobacco: none  [ ] Alcohol use: none  Occupation: [employer]     FAMILY HISTORY:  Father - DM  Mother - RA    REVIEW OF SYSTEMS:    CONSTITUTIONAL: No fever, weight loss, or fatigue  EYES: No eye pain, visual disturbances, or discharge  ENMT:  No difficulty hearing, tinnitus, vertigo; No sinus or throat pain  NECK: No pain or stiffness  RESPIRATORY: No cough, wheezing, chills or hemoptysis; No shortness of breath  CARDIOVASCULAR: No chest pain, palpitations, dizziness, or leg swelling  GASTROINTESTINAL: Flank pain as per HPI. No other abdominal or epigastric pain. nausea, vomiting as per HPI. No hematemesis; No diarrhea or constipation. No melena or hematochezia.  GENITOURINARY: No dysuria, frequency, hematuria, or incontinence  NEUROLOGICAL: No headaches, memory loss, loss of strength, numbness, or tremors  SKIN: No itching, burning, rashes, or lesions   MUSCULOSKELETAL: No muscle or back pain  PSYCHIATRIC: no depression    [X ] All other ROS negative  [  ] Unable to obtain due to poor mental status    Vital Signs Last 24 Hrs  T(C): 36.8 (17 Oct 2017 19:36), Max: 37 (17 Oct 2017 14:56)  T(F): 98.3 (17 Oct 2017 19:36), Max: 98.6 (17 Oct 2017 14:56)  HR: 65 (17 Oct 2017 19:36) (60 - 65)  BP: 141/62 (17 Oct 2017 19:36) (122/81 - 152/91)  BP(mean): --  RR: 18 (17 Oct 2017 19:36) (18 - 18)  SpO2: 97% (17 Oct 2017 19:36) (95% - 98%)    PHYSICAL EXAM:    GENERAL: NAD, well-groomed, well-developed  EYES: EOMI, PERRLA, conjunctiva and sclera clear  ENMT: Moist mucous membranes  NECK: Supple, No JVD. +Acanthosis nigricans  RESPIRATORY: Clear to auscultation bilaterally; No rales, rhonchi, wheezing, or rubs  CARDIOVASCULAR: Regular rate and rhythm; No murmurs, rubs, or gallops  GASTROINTESTINAL: Soft, Nontender, Nondistended; Bowel sounds present  BACK: No CVA tenderness  EXTREMITIES:  2+ Peripheral Pulses, No clubbing, cyanosis, or edema  NEURO:  Alert & Oriented X3; Moving all 4 extremities; No gross sensory deficits  SKIN: No rashes or lesions; Incisions C/D/I. R temple Moh's scar healing.     LABS:                        15.5   9.2   )-----------( 157      ( 17 Oct 2017 17:23 )             43.2     10-17    138  |  96  |  33<H>  ----------------------------<  117<H>  4.4   |  30  |  2.40<H>    Ca    9.8      17 Oct 2017 17:23    TPro  8.3  /  Alb  4.5  /  TBili  1.1  /  DBili  x   /  AST  20  /  ALT  24  /  AlkPhos  85  10-17    PT/INR - ( 17 Oct 2017 17:23 )   PT: 12.7 sec;   INR: 1.17 ratio         PTT - ( 17 Oct 2017 17:23 )  PTT:30.9 sec    CAPILLARY BLOOD GLUCOSE    RADIOLOGY & ADDITIONAL STUDIES:    EKG:   Personally Reviewed:  [X] YES   NSR  Qtc 448 no ischemic changes    Imaging:   Personally Reviewed:  [X] YES < from: CT Abdomen and Pelvis No Cont (10.14.17 @ 03:43) >  Mild left hydroureteronephrosis and perinephric fat stranding with couple   stones in the mid ureter measuring up to 8mm. Additional bilateral   nonobstructing intrarenal calculi.    < end of copied text >                Consultant(s) notes reviewed:    Care Discussed with Consultant(s)/Other Providers:

## 2017-10-17 NOTE — H&P ADULT - HISTORY OF PRESENT ILLNESS
Patient is 71 y M with PMH IDDM2 on metformin, htn, multiple kidney stones s/p lithotripsy, melanoma in remission presenting with dull constant left flank pain x 2 days consistent with previous kidney stones, has been having trouble sleeping, some nausea no vomiting.  Found a few days ago to have two stones in midureter, largest 8mm.

## 2017-10-17 NOTE — ED ADULT NURSE NOTE - PMH
History of Melanoma    History of Renal Calculi    Hypertension    IDDM (insulin dependent diabetes mellitus)    Kidney stone    NIDDM (Non-Insulin Dependent Diabetes Mellitus)  now uses insulin

## 2017-10-17 NOTE — ED PROVIDER NOTE - MEDICAL DECISION MAKING DETAILS
Pt with left renal stone for litho tba, pre-op labs, Analgesia offered and declined  Herbert Weber MD, Facep

## 2017-10-17 NOTE — CONSULT NOTE ADULT - PROBLEM SELECTOR RECOMMENDATION 9
Planned for lithotripsy. RCRI = 2. 6.6% chance of MACE. Pt has no cardiac symptoms, EKG is unremarkable, no objection to proceed with procedure. METS > 4. No further testing is indicated.

## 2017-10-17 NOTE — ED PROVIDER NOTE - OBJECTIVE STATEMENT
Private Physician [facility]  71y male pmh renal colic sp litho,  HTN, DM, No habits, Allerigc Glipizide. Pt  comes to complains of #2 renal stone dx 2 day ago. Now comes to ED for admit/litho. Pain varies from 9/10 to 4/10. Nausea without vomiting. Taking oxycodone with mod relief. No fever,hematuria.

## 2017-10-17 NOTE — CONSULT NOTE ADULT - PROBLEM SELECTOR RECOMMENDATION 3
Pt is on Prandin TID and Levemir 30 QHS. would dose Levemir 15 QHS tonight if NPO tomorrow for procedure, low dose sliding scale insulin. Monitor daily creatinine. Pt should be discharged on moderate intensity statin Liptor 20mg QHS.

## 2017-10-17 NOTE — CONSULT NOTE ADULT - PROBLEM SELECTOR RECOMMENDATION 2
c/w beta blocker Lopressor 50 BID as it is in place > 30 d prior to procedure. hold Diovan on day of procedure and resume after 24 hrs to reduce risk of perioperative complications. Can hold indapamide until renal function stable. c/w beta blocker Lopressor 50 BID as it is in place > 30 d prior to procedure. hold Diovan on day of procedure and resume after 24 hrs to reduce risk of perioperative complications. Can con't low dose indapamide (1.25mg) but monitor to ensure renal function stable and electrolytes acceptable

## 2017-10-17 NOTE — H&P ADULT - ASSESSMENT
L renal colic  - Admit  - Check labs, EKG, Chest xray  - Medicine called for clearance  - OR for L URS tomorrow   - NPO p MN

## 2017-10-18 ENCOUNTER — TRANSCRIPTION ENCOUNTER (OUTPATIENT)
Age: 72
End: 2017-10-18

## 2017-10-18 LAB
GLUCOSE BLDC GLUCOMTR-MCNC: 130 MG/DL — HIGH (ref 70–99)
GLUCOSE BLDC GLUCOMTR-MCNC: 177 MG/DL — HIGH (ref 70–99)
GLUCOSE BLDC GLUCOMTR-MCNC: 253 MG/DL — HIGH (ref 70–99)
GLUCOSE BLDC GLUCOMTR-MCNC: 288 MG/DL — HIGH (ref 70–99)
HBA1C BLD-MCNC: 10.2 % — HIGH (ref 4–5.6)

## 2017-10-18 RX ORDER — INSULIN LISPRO 100/ML
VIAL (ML) SUBCUTANEOUS AT BEDTIME
Qty: 0 | Refills: 0 | Status: DISCONTINUED | OUTPATIENT
Start: 2017-10-18 | End: 2017-10-19

## 2017-10-18 RX ORDER — INSULIN LISPRO 100/ML
VIAL (ML) SUBCUTANEOUS EVERY 6 HOURS
Qty: 0 | Refills: 0 | Status: DISCONTINUED | OUTPATIENT
Start: 2017-10-18 | End: 2017-10-18

## 2017-10-18 RX ORDER — INSULIN LISPRO 100/ML
VIAL (ML) SUBCUTANEOUS
Qty: 0 | Refills: 0 | Status: DISCONTINUED | OUTPATIENT
Start: 2017-10-18 | End: 2017-10-19

## 2017-10-18 RX ORDER — LIDOCAINE 4 G/100G
1 CREAM TOPICAL
Qty: 0 | Refills: 0 | Status: DISCONTINUED | OUTPATIENT
Start: 2017-10-18 | End: 2017-10-19

## 2017-10-18 RX ORDER — OXYCODONE AND ACETAMINOPHEN 5; 325 MG/1; MG/1
1 TABLET ORAL
Qty: 16 | Refills: 0
Start: 2017-10-18 | End: 2017-10-22

## 2017-10-18 RX ORDER — CEPHALEXIN 500 MG
1 CAPSULE ORAL
Qty: 12 | Refills: 0
Start: 2017-10-18 | End: 2017-10-21

## 2017-10-18 RX ADMIN — Medication 2: at 10:50

## 2017-10-18 RX ADMIN — Medication 650 MILLIGRAM(S): at 04:14

## 2017-10-18 RX ADMIN — Medication 100 MILLIGRAM(S): at 06:53

## 2017-10-18 RX ADMIN — HEPARIN SODIUM 5000 UNIT(S): 5000 INJECTION INTRAVENOUS; SUBCUTANEOUS at 21:06

## 2017-10-18 RX ADMIN — OXYCODONE HYDROCHLORIDE 5 MILLIGRAM(S): 5 TABLET ORAL at 04:14

## 2017-10-18 RX ADMIN — Medication 100 MILLIGRAM(S): at 21:06

## 2017-10-18 RX ADMIN — ATORVASTATIN CALCIUM 20 MILLIGRAM(S): 80 TABLET, FILM COATED ORAL at 21:06

## 2017-10-18 RX ADMIN — TAMSULOSIN HYDROCHLORIDE 0.4 MILLIGRAM(S): 0.4 CAPSULE ORAL at 18:21

## 2017-10-18 RX ADMIN — Medication 6: at 18:21

## 2017-10-18 RX ADMIN — Medication 50 MILLIGRAM(S): at 18:30

## 2017-10-18 RX ADMIN — INSULIN GLARGINE 15 UNIT(S): 100 INJECTION, SOLUTION SUBCUTANEOUS at 21:58

## 2017-10-18 RX ADMIN — OXYCODONE HYDROCHLORIDE 5 MILLIGRAM(S): 5 TABLET ORAL at 03:14

## 2017-10-18 RX ADMIN — Medication 100 MILLIGRAM(S): at 15:32

## 2017-10-18 RX ADMIN — LIDOCAINE 1 APPLICATION(S): 4 CREAM TOPICAL at 21:06

## 2017-10-18 RX ADMIN — Medication 650 MILLIGRAM(S): at 03:14

## 2017-10-18 RX ADMIN — Medication 1.25 MILLIGRAM(S): at 06:53

## 2017-10-18 RX ADMIN — Medication 2: at 21:54

## 2017-10-18 NOTE — BRIEF OPERATIVE NOTE - PROCEDURE
<<-----Click on this checkbox to enter Procedure Placement of ureteral stent  10/18/2017    Active  ANG5  Cystoscopy and ureteroscopy with laser lithotripsy  10/18/2017    Active  ANG5

## 2017-10-18 NOTE — DISCHARGE NOTE ADULT - CARE PROVIDERS DIRECT ADDRESSES
[email or web address]

## 2017-10-18 NOTE — DISCHARGE NOTE ADULT - CARE PROVIDER_API CALL
Kendell Rowell (MD), Urology  [address]  [place] [address]  Phone: [phone]  Fax: [phone]

## 2017-10-18 NOTE — DISCHARGE NOTE ADULT - CARE PLAN
Principal Discharge DX:	Kidney stone  Goal:	Resolution of condition and symptoms  Instructions for follow-up, activity and diet:	Please follow up with Dr. Rowell tomorrow 10/19 after discharge from the hospital. You may call [phone] to schedule an appointment.    Please strain urine Principal Discharge DX:	Kidney stone  Goal:	Resolution of condition and symptoms  Instructions for follow-up, activity and diet:	See Dr. Rowell next week for stent removal  Call the office if you experience fever, chills, uncontrolled pain, the inability to tolerate liquids, or the urine does not flow    Please strain urine

## 2017-10-18 NOTE — DISCHARGE NOTE ADULT - HOSPITAL COURSE
Patient admitted to the urology service. Left ureteroscopy, laser lithotripsy, stent placement was performed. The patient tolerated the procedure well. There were no complications. The patient was extubated in the OR and transferred to the PACU in stable condition and transferred to the urology floor. The patient's pain was controlled by IV pain medications and then by PO pain medications.     At the time of discharge, the patient was hemodynamically stable, was tolerating PO diet, and was comfortable with adequate pain control. The patient was instructed to follow up with Dr. Rowell tomorrow after discharge from the hospital. The patient/family felt comfortable with discharge. The patient was discharged to home. The patient had no other issues. The patient was discharged home with a medina

## 2017-10-18 NOTE — DISCHARGE NOTE ADULT - MEDICATION SUMMARY - MEDICATIONS TO TAKE
I will START or STAY ON the medications listed below when I get home from the hospital:    Percocet 5/325 oral tablet  -- 1 tab(s) by mouth every 6 hours, As Needed -for moderate pain MDD:4   -- Caution federal law prohibits the transfer of this drug to any person other  than the person for whom it was prescribed.  May cause drowsiness.  Alcohol may intensify this effect.  Use care when operating dangerous machinery.  This prescription cannot be refilled.  This product contains acetaminophen.  Do not use  with any other product containing acetaminophen to prevent possible liver damage.  Using more of this medication than prescribed may cause serious breathing problems.    -- Indication: For Pain    Diovan  -- 160 milligram(s) by mouth once a day  -- Indication: For home med    repaglinide 2 mg oral tablet  -- 1 tab(s) by mouth once a day  -- Indication: For home med    insulin  --     -- Indication: For home med    metoprolol  -- 50 milligram(s) by mouth 2 times a day  -- Indication: For home med    Keflex 500 mg oral capsule  -- 1 cap(s) by mouth 4 times a day x 3 days  x 7 days   -- Finish all this medication unless otherwise directed by prescriber.    -- Indication: For Prevention    indapamide 2.5 mg oral tablet  -- 1.25  by mouth once a day  -- Indication: For home med

## 2017-10-18 NOTE — DISCHARGE NOTE ADULT - PLAN OF CARE
Resolution of condition and symptoms Please follow up with Dr. Roewll tomorrow 10/19 after discharge from the hospital. You may call [phone] to schedule an appointment.    Please strain urine See Dr. Rowell next week for stent removal  Call the office if you experience fever, chills, uncontrolled pain, the inability to tolerate liquids, or the urine does not flow    Please strain urine

## 2017-10-19 VITALS
TEMPERATURE: 98 F | RESPIRATION RATE: 17 BRPM | OXYGEN SATURATION: 96 % | SYSTOLIC BLOOD PRESSURE: 137 MMHG | DIASTOLIC BLOOD PRESSURE: 79 MMHG | HEART RATE: 62 BPM

## 2017-10-19 LAB
ANION GAP SERPL CALC-SCNC: 13 MMOL/L — SIGNIFICANT CHANGE UP (ref 5–17)
BUN SERPL-MCNC: 33 MG/DL — HIGH (ref 7–23)
CALCIUM SERPL-MCNC: 9.3 MG/DL — SIGNIFICANT CHANGE UP (ref 8.4–10.5)
CHLORIDE SERPL-SCNC: 101 MMOL/L — SIGNIFICANT CHANGE UP (ref 96–108)
CO2 SERPL-SCNC: 26 MMOL/L — SIGNIFICANT CHANGE UP (ref 22–31)
CREAT SERPL-MCNC: 1.87 MG/DL — HIGH (ref 0.5–1.3)
GLUCOSE BLDC GLUCOMTR-MCNC: 136 MG/DL — HIGH (ref 70–99)
GLUCOSE BLDC GLUCOMTR-MCNC: 226 MG/DL — HIGH (ref 70–99)
GLUCOSE SERPL-MCNC: 193 MG/DL — HIGH (ref 70–99)
POTASSIUM SERPL-MCNC: 3.9 MMOL/L — SIGNIFICANT CHANGE UP (ref 3.5–5.3)
POTASSIUM SERPL-SCNC: 3.9 MMOL/L — SIGNIFICANT CHANGE UP (ref 3.5–5.3)
SODIUM SERPL-SCNC: 140 MMOL/L — SIGNIFICANT CHANGE UP (ref 135–145)

## 2017-10-19 RX ORDER — CEFAZOLIN SODIUM 1 G
2000 VIAL (EA) INJECTION EVERY 8 HOURS
Qty: 0 | Refills: 0 | Status: DISCONTINUED | OUTPATIENT
Start: 2017-10-19 | End: 2017-10-19

## 2017-10-19 RX ADMIN — OXYCODONE HYDROCHLORIDE 5 MILLIGRAM(S): 5 TABLET ORAL at 10:25

## 2017-10-19 RX ADMIN — LIDOCAINE 1 APPLICATION(S): 4 CREAM TOPICAL at 10:21

## 2017-10-19 RX ADMIN — LIDOCAINE 1 APPLICATION(S): 4 CREAM TOPICAL at 13:48

## 2017-10-19 RX ADMIN — Medication 4: at 11:53

## 2017-10-19 RX ADMIN — TAMSULOSIN HYDROCHLORIDE 0.4 MILLIGRAM(S): 0.4 CAPSULE ORAL at 11:53

## 2017-10-19 RX ADMIN — LIDOCAINE 1 APPLICATION(S): 4 CREAM TOPICAL at 06:42

## 2017-10-19 RX ADMIN — Medication 100 MILLIGRAM(S): at 13:49

## 2017-10-19 RX ADMIN — Medication 100 MILLIGRAM(S): at 06:40

## 2017-10-19 RX ADMIN — LIDOCAINE 1 APPLICATION(S): 4 CREAM TOPICAL at 06:41

## 2017-10-19 RX ADMIN — LIDOCAINE 1 APPLICATION(S): 4 CREAM TOPICAL at 01:11

## 2017-10-19 RX ADMIN — SODIUM CHLORIDE 100 MILLILITER(S): 9 INJECTION INTRAMUSCULAR; INTRAVENOUS; SUBCUTANEOUS at 10:21

## 2017-10-19 RX ADMIN — Medication 50 MILLIGRAM(S): at 06:40

## 2017-10-19 RX ADMIN — Medication 1.25 MILLIGRAM(S): at 06:40

## 2017-10-19 RX ADMIN — HEPARIN SODIUM 5000 UNIT(S): 5000 INJECTION INTRAVENOUS; SUBCUTANEOUS at 08:05

## 2017-10-19 RX ADMIN — OXYCODONE HYDROCHLORIDE 5 MILLIGRAM(S): 5 TABLET ORAL at 10:55

## 2017-10-19 NOTE — DIETITIAN INITIAL EVALUATION ADULT. - ADHERENCE
Pt follows a low sodium diabetic diet PTA. Pt reports not always being complianht with diabetic diet, noted to have (10/17) HgbA1c 10.2%. Pt checks fingersticks 1-2x daily, range of 100-300mg/dl./fair

## 2017-10-19 NOTE — DIETITIAN INITIAL EVALUATION ADULT. - ENERGY NEEDS
Ht: 75 inches Wt: 235 pounds BMI:29.4 kg/m2 IBW:  196 pounds(+/-10%) %%  No edema. No pressure ulcers documented.

## 2017-10-19 NOTE — PROGRESS NOTE ADULT - ASSESSMENT
71M POD 1 s/p L ureteroscopy, laser lithotripsy, stent.    --continue to monitor color, if improves will do TOV  --continue ancef  -- pain control  -- Out of bed, pain control, incentive spirometry, DVT prophylaxis   -- dispo planning for later today

## 2017-10-19 NOTE — DIETITIAN INITIAL EVALUATION ADULT. - OTHER INFO
Nutrition consult received for elevated HgbA1c. Per chart, pt 71M POD 1 s/p L ureteroscopy, laser lithotripsy, stent.. Pt reports good appetite, eating >75% of meals. Pt denies any history of chewing/swallowing difficulty or GI distress at this time. Last BM today. Pt reports weight has been stable,  pounds, current weight 235 pounds.  Pt amenable to verbal review of DM nutrition therapy, reports he has outpatient dietitian and written materials at home.

## 2017-10-19 NOTE — DIETITIAN INITIAL EVALUATION ADULT. - NS AS NUTRI INTERV ED CONTENT
Discussed brief DM nutrition therapy including: sources of CHO,  pairing CHO with proteins, consistent eating patterns, and importance of self monitoring blood glucose levels. Pt verbalized understanding.

## 2017-11-29 NOTE — ED ADULT NURSE NOTE - TEMPLATE LIST FOR HEAD TO TOE ASSESSMENT
Controlled Substance Refill Request for Ultram  Problem List Complete:  Yes    Last Written Prescription Date:  10/12/17  Last Fill Quantity: 180,   # refills: 0    Last Office Visit with [facility] primary care provider: EDUARDO Asher 11/17/17    Clinic visit frequency required: not noted     Future Office visit:   Next 5 appointments (look out 90 days)     Jan 31, 2018  9:40 AM CST   (Arrive by 9:25 AM)   Return Visit with Aurelia Odell MD   [facility] ([facility] )    [address] [address]   [phone]            Feb 15, 2018  2:20 PM CST   (Arrive by 2:00 PM)   Return Visit with Jurgen Crawford MD    ORTHOPEDICS ([facility] )    [address] [address]   [phone]                  Controlled substance agreement on file: No.     Processing:  [employer]      
Tramadol      Last Written Prescription Date: 10/12/17  Last Fill Quantity: 180,  # refills: 0   Last Office Visit with G, P or [facility] prescribing provider: 11/17/17                                               
 Symptoms

## 2017-12-08 PROBLEM — E11.9 TYPE 2 DIABETES MELLITUS WITHOUT COMPLICATIONS: Chronic | Status: ACTIVE | Noted: 2017-10-17

## 2017-12-13 ENCOUNTER — APPOINTMENT (OUTPATIENT)
Dept: INTERNAL MEDICINE | Facility: CLINIC | Age: 72
End: 2017-12-13

## 2017-12-14 ENCOUNTER — FORM ENCOUNTER (OUTPATIENT)
Age: 72
End: 2017-12-14

## 2017-12-15 ENCOUNTER — APPOINTMENT (OUTPATIENT)
Dept: RADIOLOGY | Facility: IMAGING CENTER | Age: 72
End: 2017-12-15
Payer: MEDICARE

## 2017-12-15 ENCOUNTER — APPOINTMENT (OUTPATIENT)
Dept: INTERNAL MEDICINE | Facility: CLINIC | Age: 72
End: 2017-12-15
Payer: MEDICARE

## 2017-12-15 ENCOUNTER — OUTPATIENT (OUTPATIENT)
Dept: OUTPATIENT SERVICES | Facility: HOSPITAL | Age: 72
LOS: 1 days | End: 2017-12-15
Payer: MEDICARE

## 2017-12-15 VITALS
OXYGEN SATURATION: 97 % | BODY MASS INDEX: 28.6 KG/M2 | WEIGHT: 230 LBS | HEART RATE: 95 BPM | DIASTOLIC BLOOD PRESSURE: 70 MMHG | TEMPERATURE: 98.8 F | SYSTOLIC BLOOD PRESSURE: 128 MMHG | HEIGHT: 75 IN

## 2017-12-15 DIAGNOSIS — Q62.10 CONGENITAL OCCLUSION OF URETER, UNSPECIFIED: Chronic | ICD-10-CM

## 2017-12-15 DIAGNOSIS — R05 COUGH: ICD-10-CM

## 2017-12-15 PROCEDURE — 71020: CPT | Mod: 26

## 2017-12-15 PROCEDURE — 99213 OFFICE O/P EST LOW 20 MIN: CPT

## 2017-12-15 PROCEDURE — 71046 X-RAY EXAM CHEST 2 VIEWS: CPT

## 2017-12-15 RX ORDER — IPRATROPIUM BROMIDE 42 UG/1
0.06 SPRAY NASAL
Qty: 1 | Refills: 0 | Status: COMPLETED | COMMUNITY
Start: 2017-12-15 | End: 2017-12-25

## 2017-12-18 LAB
ALBUMIN SERPL ELPH-MCNC: 4.5 G/DL
ALP BLD-CCNC: 93 U/L
ALT SERPL-CCNC: 13 U/L
ANION GAP SERPL CALC-SCNC: 13 MMOL/L
AST SERPL-CCNC: 16 U/L
BASOPHILS # BLD AUTO: 0.03 K/UL
BASOPHILS NFR BLD AUTO: 0.3 %
BILIRUB SERPL-MCNC: 1.1 MG/DL
BUN SERPL-MCNC: 28 MG/DL
CALCIUM SERPL-MCNC: 10.2 MG/DL
CHLORIDE SERPL-SCNC: 99 MMOL/L
CO2 SERPL-SCNC: 28 MMOL/L
CREAT SERPL-MCNC: 1.69 MG/DL
EOSINOPHIL # BLD AUTO: 0.25 K/UL
EOSINOPHIL NFR BLD AUTO: 2.7
GLUCOSE SERPL-MCNC: 185 MG/DL
HCT VFR BLD CALC: 41.2 %
HGB BLD-MCNC: 14.1 G/DL
IMM GRANULOCYTES NFR BLD AUTO: 0.2 %
LYMPHOCYTES # BLD AUTO: 2.07 K/UL
LYMPHOCYTES NFR BLD AUTO: 22.5 %
MAN DIFF?: NORMAL
MCHC RBC-ENTMCNC: 30.7 PG
MCHC RBC-ENTMCNC: 34.2 GM/DL
MCV RBC AUTO: 89.6 FL
MONOCYTES # BLD AUTO: 0.55 K/UL
MONOCYTES NFR BLD AUTO: 6 %
NEUTROPHILS # BLD AUTO: 6.28 K/UL
NEUTROPHILS NFR BLD AUTO: 68.3 %
PLATELET # BLD AUTO: 187 K/UL
POTASSIUM SERPL-SCNC: 5 MMOL/L
PROT SERPL-MCNC: 8.3 G/DL
RBC # BLD: 4.6 M/UL
RBC # FLD: 13.6 %
SODIUM SERPL-SCNC: 140 MMOL/L
WBC # FLD AUTO: 9.2 K/UL

## 2017-12-19 PROCEDURE — 76000 FLUOROSCOPY <1 HR PHYS/QHP: CPT

## 2017-12-19 PROCEDURE — 83605 ASSAY OF LACTIC ACID: CPT

## 2017-12-19 PROCEDURE — 99284 EMERGENCY DEPT VISIT MOD MDM: CPT | Mod: 25

## 2017-12-19 PROCEDURE — 82803 BLOOD GASES ANY COMBINATION: CPT

## 2017-12-19 PROCEDURE — 80053 COMPREHEN METABOLIC PANEL: CPT

## 2017-12-19 PROCEDURE — 74176 CT ABD & PELVIS W/O CONTRAST: CPT

## 2017-12-19 PROCEDURE — 84132 ASSAY OF SERUM POTASSIUM: CPT

## 2017-12-19 PROCEDURE — 99285 EMERGENCY DEPT VISIT HI MDM: CPT | Mod: 25

## 2017-12-19 PROCEDURE — 81001 URINALYSIS AUTO W/SCOPE: CPT

## 2017-12-19 PROCEDURE — 74018 RADEX ABDOMEN 1 VIEW: CPT

## 2017-12-19 PROCEDURE — 80048 BASIC METABOLIC PNL TOTAL CA: CPT

## 2017-12-19 PROCEDURE — 85014 HEMATOCRIT: CPT

## 2017-12-19 PROCEDURE — 86850 RBC ANTIBODY SCREEN: CPT

## 2017-12-19 PROCEDURE — 82330 ASSAY OF CALCIUM: CPT

## 2017-12-19 PROCEDURE — 85027 COMPLETE CBC AUTOMATED: CPT

## 2017-12-19 PROCEDURE — C1889: CPT

## 2017-12-19 PROCEDURE — 87086 URINE CULTURE/COLONY COUNT: CPT

## 2017-12-19 PROCEDURE — C2617: CPT

## 2017-12-19 PROCEDURE — 93005 ELECTROCARDIOGRAM TRACING: CPT

## 2017-12-19 PROCEDURE — 82947 ASSAY GLUCOSE BLOOD QUANT: CPT

## 2017-12-19 PROCEDURE — 86901 BLOOD TYPING SEROLOGIC RH(D): CPT

## 2017-12-19 PROCEDURE — C1769: CPT

## 2017-12-19 PROCEDURE — 85730 THROMBOPLASTIN TIME PARTIAL: CPT

## 2017-12-19 PROCEDURE — 84295 ASSAY OF SERUM SODIUM: CPT

## 2017-12-19 PROCEDURE — 85610 PROTHROMBIN TIME: CPT

## 2017-12-19 PROCEDURE — C1758: CPT

## 2017-12-19 PROCEDURE — 71045 X-RAY EXAM CHEST 1 VIEW: CPT

## 2017-12-19 PROCEDURE — 86900 BLOOD TYPING SEROLOGIC ABO: CPT

## 2017-12-19 PROCEDURE — 82962 GLUCOSE BLOOD TEST: CPT

## 2017-12-19 PROCEDURE — 83036 HEMOGLOBIN GLYCOSYLATED A1C: CPT

## 2017-12-19 PROCEDURE — 82435 ASSAY OF BLOOD CHLORIDE: CPT

## 2018-02-16 ENCOUNTER — MEDICATION RENEWAL (OUTPATIENT)
Age: 73
End: 2018-02-16

## 2018-03-01 RX ORDER — BLOOD-GLUCOSE METER
KIT MISCELLANEOUS
Qty: 3 | Refills: 3 | Status: ACTIVE | COMMUNITY
Start: 2018-03-01

## 2018-03-31 ENCOUNTER — EMERGENCY (EMERGENCY)
Facility: HOSPITAL | Age: 73
LOS: 1 days | Discharge: HOME | End: 2018-03-31
Attending: EMERGENCY MEDICINE | Admitting: EMERGENCY MEDICINE
Payer: MEDICARE

## 2018-03-31 VITALS
RESPIRATION RATE: 16 BRPM | OXYGEN SATURATION: 96 % | HEART RATE: 72 BPM | DIASTOLIC BLOOD PRESSURE: 77 MMHG | WEIGHT: 227.96 LBS | SYSTOLIC BLOOD PRESSURE: 151 MMHG | TEMPERATURE: 98 F

## 2018-03-31 VITALS
TEMPERATURE: 98 F | RESPIRATION RATE: 16 BRPM | SYSTOLIC BLOOD PRESSURE: 151 MMHG | DIASTOLIC BLOOD PRESSURE: 80 MMHG | HEART RATE: 72 BPM | OXYGEN SATURATION: 98 %

## 2018-03-31 DIAGNOSIS — Q62.10 CONGENITAL OCCLUSION OF URETER, UNSPECIFIED: Chronic | ICD-10-CM

## 2018-03-31 LAB
ALBUMIN SERPL ELPH-MCNC: 4.4 G/DL — SIGNIFICANT CHANGE UP (ref 3.3–5)
ALP SERPL-CCNC: 91 U/L — SIGNIFICANT CHANGE UP (ref 40–120)
ALT FLD-CCNC: 16 U/L RC — SIGNIFICANT CHANGE UP (ref 10–45)
ANION GAP SERPL CALC-SCNC: 13 MMOL/L — SIGNIFICANT CHANGE UP (ref 5–17)
APPEARANCE UR: ABNORMAL
AST SERPL-CCNC: 17 U/L — SIGNIFICANT CHANGE UP (ref 10–40)
BACTERIA # UR AUTO: ABNORMAL /HPF
BASOPHILS # BLD AUTO: 0 K/UL — SIGNIFICANT CHANGE UP (ref 0–0.2)
BASOPHILS NFR BLD AUTO: 0.2 % — SIGNIFICANT CHANGE UP (ref 0–2)
BILIRUB SERPL-MCNC: 1.3 MG/DL — HIGH (ref 0.2–1.2)
BILIRUB UR-MCNC: NEGATIVE — SIGNIFICANT CHANGE UP
BUN SERPL-MCNC: 23 MG/DL — SIGNIFICANT CHANGE UP (ref 7–23)
CALCIUM SERPL-MCNC: 10.1 MG/DL — SIGNIFICANT CHANGE UP (ref 8.4–10.5)
CHLORIDE SERPL-SCNC: 99 MMOL/L — SIGNIFICANT CHANGE UP (ref 96–108)
CO2 SERPL-SCNC: 26 MMOL/L — SIGNIFICANT CHANGE UP (ref 22–31)
COLOR SPEC: ABNORMAL
CREAT SERPL-MCNC: 1.57 MG/DL — HIGH (ref 0.5–1.3)
DIFF PNL FLD: ABNORMAL
EOSINOPHIL # BLD AUTO: 0 K/UL — SIGNIFICANT CHANGE UP (ref 0–0.5)
EOSINOPHIL NFR BLD AUTO: 0.3 % — SIGNIFICANT CHANGE UP (ref 0–6)
GLUCOSE SERPL-MCNC: 200 MG/DL — HIGH (ref 70–99)
GLUCOSE UR QL: NEGATIVE — SIGNIFICANT CHANGE UP
HCT VFR BLD CALC: 46.4 % — SIGNIFICANT CHANGE UP (ref 39–50)
HGB BLD-MCNC: 16.4 G/DL — SIGNIFICANT CHANGE UP (ref 13–17)
KETONES UR-MCNC: NEGATIVE — SIGNIFICANT CHANGE UP
LEUKOCYTE ESTERASE UR-ACNC: ABNORMAL
LYMPHOCYTES # BLD AUTO: 1.7 K/UL — SIGNIFICANT CHANGE UP (ref 1–3.3)
LYMPHOCYTES # BLD AUTO: 18.3 % — SIGNIFICANT CHANGE UP (ref 13–44)
MCHC RBC-ENTMCNC: 31.6 PG — SIGNIFICANT CHANGE UP (ref 27–34)
MCHC RBC-ENTMCNC: 35.4 GM/DL — SIGNIFICANT CHANGE UP (ref 32–36)
MCV RBC AUTO: 89.4 FL — SIGNIFICANT CHANGE UP (ref 80–100)
MONOCYTES # BLD AUTO: 0.5 K/UL — SIGNIFICANT CHANGE UP (ref 0–0.9)
MONOCYTES NFR BLD AUTO: 5.6 % — SIGNIFICANT CHANGE UP (ref 2–14)
NEUTROPHILS # BLD AUTO: 7 K/UL — SIGNIFICANT CHANGE UP (ref 1.8–7.4)
NEUTROPHILS NFR BLD AUTO: 75.6 % — SIGNIFICANT CHANGE UP (ref 43–77)
NITRITE UR-MCNC: NEGATIVE — SIGNIFICANT CHANGE UP
PH UR: 5.5 — SIGNIFICANT CHANGE UP (ref 5–8)
PLATELET # BLD AUTO: 153 K/UL — SIGNIFICANT CHANGE UP (ref 150–400)
POTASSIUM SERPL-MCNC: 4.2 MMOL/L — SIGNIFICANT CHANGE UP (ref 3.5–5.3)
POTASSIUM SERPL-SCNC: 4.2 MMOL/L — SIGNIFICANT CHANGE UP (ref 3.5–5.3)
PROT SERPL-MCNC: 8.2 G/DL — SIGNIFICANT CHANGE UP (ref 6–8.3)
PROT UR-MCNC: 100 MG/DL
RBC # BLD: 5.19 M/UL — SIGNIFICANT CHANGE UP (ref 4.2–5.8)
RBC # FLD: 11.6 % — SIGNIFICANT CHANGE UP (ref 10.3–14.5)
RBC CASTS # UR COMP ASSIST: >50 /HPF (ref 0–2)
SODIUM SERPL-SCNC: 138 MMOL/L — SIGNIFICANT CHANGE UP (ref 135–145)
SP GR SPEC: 1.02 — SIGNIFICANT CHANGE UP (ref 1.01–1.02)
UROBILINOGEN FLD QL: NEGATIVE — SIGNIFICANT CHANGE UP
WBC # BLD: 9.2 K/UL — SIGNIFICANT CHANGE UP (ref 3.8–10.5)
WBC # FLD AUTO: 9.2 K/UL — SIGNIFICANT CHANGE UP (ref 3.8–10.5)
WBC UR QL: >50 /HPF (ref 0–5)

## 2018-03-31 PROCEDURE — 74176 CT ABD & PELVIS W/O CONTRAST: CPT | Mod: 26

## 2018-03-31 PROCEDURE — 80053 COMPREHEN METABOLIC PANEL: CPT

## 2018-03-31 PROCEDURE — 99284 EMERGENCY DEPT VISIT MOD MDM: CPT

## 2018-03-31 PROCEDURE — 81001 URINALYSIS AUTO W/SCOPE: CPT

## 2018-03-31 PROCEDURE — 87186 SC STD MICRODIL/AGAR DIL: CPT

## 2018-03-31 PROCEDURE — 74176 CT ABD & PELVIS W/O CONTRAST: CPT

## 2018-03-31 PROCEDURE — 85027 COMPLETE CBC AUTOMATED: CPT

## 2018-03-31 PROCEDURE — 87086 URINE CULTURE/COLONY COUNT: CPT

## 2018-03-31 RX ORDER — CEPHALEXIN 500 MG
1 CAPSULE ORAL
Qty: 28 | Refills: 0
Start: 2018-03-31 | End: 2018-04-06

## 2018-03-31 RX ORDER — CEPHALEXIN 500 MG
1 CAPSULE ORAL
Qty: 28 | Refills: 0 | OUTPATIENT
Start: 2018-03-31 | End: 2018-04-06

## 2018-03-31 NOTE — ED SUB INTERN NOTE - OBJECTIVE STATEMENT FT
72 year old M w/ a PMH of melanoma, renal calculi, HTN, IDDM presents to the ED c/o painless & constant hematuria x 1 day. Pt states blood in urine appeared yesterday around 1030 am, states her urinated 4 times total since he noticed and each time it was red, except the last time which he noticed a more martell color. Pt has nephrolithiasis 4 times in his life, s/p lithotripsy in october of 2017, in which he passed a total of 4 stones. Complaint described as discomfort, no pain associated. Pt has an appointment scheduled with his nephrolithiasis Dr. Metz on Monday at 2:45.     Denies fever/chills, n/v/d, chest pain, SOB, ABD pain, constipation, dysuria, urgency, difficulty starting stream, polyuria, discharge.

## 2018-03-31 NOTE — ED PROVIDER NOTE - CARE PLAN
Principal Discharge DX:	Kidney stone Principal Discharge DX:	Kidney stone  Assessment and plan of treatment:	(1) Please follow up with Dr. Metz on Monday for your 2:45 appointment.   (2) Take Cephalexin 500 mg four times a day for seven days.   (3) For mild to moderate pain take Tylenol 650mg every 4h as needed.  (4) Return to ER for pain not controlled, fever, chills, nausea/vomiting or any new or worsening symptoms.

## 2018-03-31 NOTE — ED PROVIDER NOTE - ATTENDING CONTRIBUTION TO CARE
73 y/o m with pmhx Kidney stones, HTN, HLD DM type 2, presents for hematuria since last night. fist episode in the afternoon and had approx 3 episodes. no fever. no chills. also having pain on right flank. does not feel like usual stones. no pain med taken. no relief with position. no freq / urg/ dysuria. called his urologist (Dr. Metz) and PMD who referred him to ER.   Gen.  no acute distress  Lungs:  ctab/l  CVS: S1S2   Abd;  soft non tend. no disten. no cva tenderness  Ext:  no deformity, full range of motion  Neuro: aaox3 no focal deficits   MSK: 5/5 x4 ext

## 2018-03-31 NOTE — ED PROVIDER NOTE - MEDICAL DECISION MAKING DETAILS
ATTG: flank pain and hematuria - check labs, check urine, check ct a/p. ivf, pain medication, re eval for dispo

## 2018-03-31 NOTE — ED PROVIDER NOTE - OBJECTIVE STATEMENT
71 YO PMhx Renal calculi, HTN, IDDM, kidney stone, presents to ED c/o hematuria x 1 day. Pt notes 4 times of hematuria, bright red, which decreased in intensity to light martell color the last time. He notes s/p lithotripsy 11/2017, in which he passed 4 stones. He complains of right flank discomfort rather than pain. Appt with urologist Dr. Metz Monday at 2;45PM. Denies fever, chills, n/v/d, CP, SOB, abd pain, urinary symptoms: dysuria, urgency, polyuria, discharge. 73 YO PMhx Renal calculi, HTN, IDDM, kidney stone, presents to ED c/o hematuria x 1 day. Pt notes 4 times of hematuria, bright red, which decreased in intensity to light martell color. He notes s/p lithotripsy 11/2017, in which he passed 4 stones, with no episodes of hematuria since then. He complains of right flank discomfort rather than pain. Appt with urologist Dr. Metz Monday at 2;45PM. Denies fever, chills, n/v/d, CP, SOB, abd pain, urinary symptoms: dysuria, urgency, polyuria, discharge.

## 2018-03-31 NOTE — ED PROVIDER NOTE - PROGRESS NOTE DETAILS
ATTG: Dr. Donahue - case discussed with Urology as they are familiar with Dr. Metz's patient. rec close follow up. although elevated wbc and rbc, absence of nitrate, fever , systemic symptoms, less likely to be infectious and do not rec abx. can dc home. ATTG: Dr. Donahue - case discussed with Urology as they are familiar with Dr. Metz's patient. rec close follow up. although elevated wbc and rbc, absence of nitrate, fever , systemic symptoms, less likely to be infectious and do not rec abx. can dc home. Underlying renal dysfunction present on outpatient prior labs. patient provided with copy of results.

## 2018-03-31 NOTE — ED ADULT NURSE NOTE - OBJECTIVE STATEMENT
72 year old male presents to ED c/o blood in urine starting yesterday morning that has now resolved. Patient denies chest pain, dizziness, nausea or weakness. Patient has urology appointment 2 days from now.  ABd nondistended nontender. SKin warm dry intact. In no acute distress.

## 2018-03-31 NOTE — ED PROVIDER NOTE - PLAN OF CARE
(1) Please follow up with Dr. Metz on Monday for your 2:45 appointment.   (2) Take Cephalexin 500 mg four times a day for seven days.   (3) For mild to moderate pain take Tylenol 650mg every 4h as needed.  (4) Return to ER for pain not controlled, fever, chills, nausea/vomiting or any new or worsening symptoms.

## 2018-04-02 LAB
-  AMIKACIN: SIGNIFICANT CHANGE UP
-  AZTREONAM: SIGNIFICANT CHANGE UP
-  CEFEPIME: SIGNIFICANT CHANGE UP
-  CEFTAZIDIME: SIGNIFICANT CHANGE UP
-  CIPROFLOXACIN: SIGNIFICANT CHANGE UP
-  GENTAMICIN: SIGNIFICANT CHANGE UP
-  IMIPENEM: SIGNIFICANT CHANGE UP
-  LEVOFLOXACIN: SIGNIFICANT CHANGE UP
-  MEROPENEM: SIGNIFICANT CHANGE UP
-  PIPERACILLIN/TAZOBACTAM: SIGNIFICANT CHANGE UP
-  TOBRAMYCIN: SIGNIFICANT CHANGE UP
CULTURE RESULTS: SIGNIFICANT CHANGE UP
METHOD TYPE: SIGNIFICANT CHANGE UP
ORGANISM # SPEC MICROSCOPIC CNT: SIGNIFICANT CHANGE UP
ORGANISM # SPEC MICROSCOPIC CNT: SIGNIFICANT CHANGE UP
SPECIMEN SOURCE: SIGNIFICANT CHANGE UP

## 2018-04-02 RX ORDER — CIPROFLOXACIN LACTATE 400MG/40ML
1 VIAL (ML) INTRAVENOUS
Qty: 10 | Refills: 0
Start: 2018-04-02 | End: 2018-04-06

## 2018-04-02 NOTE — ED POST DISCHARGE NOTE - DETAILS
on vm to call back. patient seen in setting of recent stone s/p lithotripsy, sent on concepcion Villanueva PA-C

## 2018-04-02 NOTE — ED POST DISCHARGE NOTE - OTHER COMMUNICATION
spoke with patient regarding results, still with hematuria. saw uro today- faxing results to urologist and recommened change to cipro. - Staci Villanueva PA-C

## 2018-04-06 ENCOUNTER — RX RENEWAL (OUTPATIENT)
Age: 73
End: 2018-04-06

## 2018-04-09 ENCOUNTER — MEDICATION RENEWAL (OUTPATIENT)
Age: 73
End: 2018-04-09

## 2018-07-10 ENCOUNTER — APPOINTMENT (OUTPATIENT)
Dept: INTERNAL MEDICINE | Facility: CLINIC | Age: 73
End: 2018-07-10
Payer: MEDICARE

## 2018-07-10 VITALS
HEART RATE: 76 BPM | WEIGHT: 232 LBS | OXYGEN SATURATION: 96 % | DIASTOLIC BLOOD PRESSURE: 68 MMHG | SYSTOLIC BLOOD PRESSURE: 112 MMHG | HEIGHT: 75 IN | BODY MASS INDEX: 28.85 KG/M2

## 2018-07-10 DIAGNOSIS — H91.21 SUDDEN IDIOPATHIC HEARING LOSS, RIGHT EAR: ICD-10-CM

## 2018-07-10 DIAGNOSIS — E29.1 TESTICULAR HYPOFUNCTION: ICD-10-CM

## 2018-07-10 DIAGNOSIS — H69.83 OTHER SPECIFIED DISORDERS OF EUSTACHIAN TUBE, BILATERAL: ICD-10-CM

## 2018-07-10 DIAGNOSIS — H61.23 IMPACTED CERUMEN, BILATERAL: ICD-10-CM

## 2018-07-10 PROCEDURE — G0439: CPT

## 2018-07-10 RX ORDER — DEXAMETHASONE SODIUM PHOSPHATE 1 MG/ML
0.1 SOLUTION/ DROPS OPHTHALMIC
Qty: 10 | Refills: 5 | Status: DISCONTINUED | COMMUNITY
Start: 2017-03-24 | End: 2018-07-10

## 2018-07-10 NOTE — PHYSICAL EXAM
[General Appearance - Alert] : alert [General Appearance - In No Acute Distress] : in no acute distress [Sclera] : the sclera and conjunctiva were normal [PERRL With Normal Accommodation] : pupils were equal in size, round, and reactive to light [Extraocular Movements] : extraocular movements were intact [Outer Ear] : the ears and nose were normal in appearance [Oropharynx] : the oropharynx was normal [Neck Appearance] : the appearance of the neck was normal [Neck Cervical Mass (___cm)] : no neck mass was observed [Jugular Venous Distention Increased] : there was no jugular-venous distention [Thyroid Diffuse Enlargement] : the thyroid was not enlarged [Thyroid Nodule] : there were no palpable thyroid nodules [Auscultation Breath Sounds / Voice Sounds] : lungs were clear to auscultation bilaterally [Heart Rate And Rhythm] : heart rate was normal and rhythm regular [Heart Sounds] : normal S1 and S2 [Heart Sounds Gallop] : no gallops [Murmurs] : no murmurs [Heart Sounds Pericardial Friction Rub] : no pericardial rub [Full Pulse] : the pedal pulses are present [Edema] : there was no peripheral edema [Bowel Sounds] : normal bowel sounds [Abdomen Soft] : soft [Abdomen Tenderness] : non-tender [] : no hepato-splenomegaly [Abdomen Mass (___ Cm)] : no abdominal mass palpated [Normal Sphincter Tone] : normal sphincter tone [No Rectal Mass] : no rectal mass [Penis Abnormality] : the penis was normal [Scrotum] : the scrotum was normal [Testes Swelling] : the testicles were not swollen [Testes Mass (___cm)] : there were no testicular masses [Prostate Enlargement] : the prostate was not enlarged [Prostate Tenderness] : the prostate was not tender [Cervical Lymph Nodes Enlarged Posterior Bilaterally] : posterior cervical [Cervical Lymph Nodes Enlarged Anterior Bilaterally] : anterior cervical [Supraclavicular Lymph Nodes Enlarged Bilaterally] : supraclavicular [Femoral Lymph Nodes Enlarged Bilaterally] : femoral [Inguinal Lymph Nodes Enlarged Bilaterally] : inguinal [No CVA Tenderness] : no ~M costovertebral angle tenderness [No Spinal Tenderness] : no spinal tenderness [Abnormal Walk] : normal gait [Nail Clubbing] : no clubbing  or cyanosis of the fingernails [Involuntary Movements] : no involuntary movements were seen [Musculoskeletal - Swelling] : no joint swelling seen [Motor Tone] : muscle strength and tone were normal [2+] : 2+ in the dorsalis pedis [Sensation] : the sensory exam was normal to light touch and pinprick [Motor Exam] : the motor exam was normal [No Focal Deficits] : no focal deficits [Oriented To Time, Place, And Person] : oriented to person, place, and time [Impaired Insight] : insight and judgment were intact [Affect] : the affect was normal [Internal Hemorrhoid] : no internal hemorrhoids [External Hemorrhoid] : no external hemorrhoids [Diminished Throughout Right Foot] : normal tactile sensation with monofilament testing throughout right foot [Diminished Throughout Left Foot] : normal tactile sensation with monofilament testing throughout left foot [FreeTextEntry1] : s/p removal of skin lesions on posterior RUE with residual scarring noted and muscle atrophy; significant diffuse onychomycosis of the bilateral toenails appreciated

## 2018-07-10 NOTE — DISCUSSION/SUMMARY
[Fall precautions] : fall precautions [Social support] : social support [Living arrangements] : living arrangements [Medication Management] : medication management [Obese (BMI >29.9)] : Obese - BMI >29.9 [Weight loss counseling given] : Weight loss counseling given [150 min/wk aerobic activity @ 60% MHR recommended] : 150 min/wk aerobic activity @ 60% MHR recommended [Resistance training 2 days per week recommended] : Resistance training 2 days per week recommended [TLC diet recommended] : TLC diet recommended [Non - Smoker] : non-smoker [FreeTextEntry1] : \par 72 year old man with hx DM-II, HTN, melanoma, onychomycosis presents for annual CPE.\par \par 1. DM-II: currently on Sulfonylurea and Tradjenta as per Endo; check HbA1c, urine microalbumin studies with labs\par \par 2. HTN: cont Diovan and Metoprolol; BP at goal; check electrolytes, renal panel with labs\par \par 3. Melanoma: stable, pt follows with Onc in [facility] regularly, passed 5-yr disease-free state as per pt in 2014; he reports getting his PET/CT scans done as per Onc\par \par 4. Onychomycosis: significant on exam today, will refer to Podiatry for treatment given hx DM-II\par \par 5. HCM: Pt reports colonoscopy within 10 years, will have copy sent to our office for review; praised pt's tobacco-free, drug-free, alcohol-free lifestyle; prostate exam benign in office today, check PSA with labs

## 2018-07-10 NOTE — REASON FOR VISIT
[CPE] : a comprehensive physical exam [Pre-Visit Preparation] : pre-visit preparation was done [Intercurrent Specialty/Sub-specialty Visits] : the patient has no intercurrent specialty/sub-specialty visits

## 2018-07-10 NOTE — HISTORY OF PRESENT ILLNESS
[Health Maintenance] : health maintenance [___ Year(s) Ago] : [unfilled] year(s) ago [Unmarried] : unmarried [Working Full Time] : working full time [Occupation ___] : occupation: [unfilled] [Former Cigarette Smoker] : is a former cigarette smoker [Rare Use] : rare alcohol use [None] : The patient has no concerns about alcohol abuse [Never] : has never used illicit drugs [Good] : good [Reg. Dental Visits] : He has regular dental visits [Healthy Diet] : He consumes a diverse and healthy diet [Weight Concerns] : He has weight concens [Regular Exercise] : He exercises regularly [Drug Abuse] : He uses illicit drugs [Sexually Active] : the patient is sexually active [Monogamous (Female Partner)] : is monogamous with a female partner [Reviewed and Updated] : risk screening reviewed and updated [Vision Problems] : He denies vision problems [Hearing Loss] : He denies hearing loss [Tobacco Use] : He does not use tobacco [Alcohol Use] : He denies alcohol use

## 2018-07-12 RX ORDER — ASPIRIN ENTERIC COATED TABLETS 81 MG 81 MG/1
81 TABLET, DELAYED RELEASE ORAL
Refills: 0 | Status: ACTIVE | COMMUNITY

## 2018-07-17 LAB
25(OH)D3 SERPL-MCNC: 17.7 NG/ML
ALBUMIN SERPL ELPH-MCNC: 4.8 G/DL
ALP BLD-CCNC: 85 U/L
ALT SERPL-CCNC: 19 U/L
ANION GAP SERPL CALC-SCNC: 18 MMOL/L
AST SERPL-CCNC: 19 U/L
BASOPHILS # BLD AUTO: 0.02 K/UL
BASOPHILS NFR BLD AUTO: 0.3 %
BILIRUB SERPL-MCNC: 1.2 MG/DL
BUN SERPL-MCNC: 31 MG/DL
CALCIUM SERPL-MCNC: 9.9 MG/DL
CHLORIDE SERPL-SCNC: 98 MMOL/L
CHOLEST SERPL-MCNC: 107 MG/DL
CHOLEST/HDLC SERPL: 3.1 RATIO
CO2 SERPL-SCNC: 24 MMOL/L
CREAT SERPL-MCNC: 1.99 MG/DL
EOSINOPHIL # BLD AUTO: 0.11 K/UL
EOSINOPHIL NFR BLD AUTO: 1.4 %
FERRITIN SERPL-MCNC: 158 NG/ML
GLUCOSE SERPL-MCNC: 356 MG/DL
HBA1C MFR BLD HPLC: 9.5 %
HCT VFR BLD CALC: 44.8 %
HDLC SERPL-MCNC: 35 MG/DL
HGB BLD-MCNC: 15.8 G/DL
HIV1+2 AB SPEC QL IA.RAPID: NONREACTIVE
IMM GRANULOCYTES NFR BLD AUTO: 0.1 %
IRON SATN MFR SERPL: 28 %
IRON SERPL-MCNC: 92 UG/DL
LDLC SERPL CALC-MCNC: 43 MG/DL
LYMPHOCYTES # BLD AUTO: 2.28 K/UL
LYMPHOCYTES NFR BLD AUTO: 29.9 %
MAN DIFF?: NORMAL
MCHC RBC-ENTMCNC: 31.3 PG
MCHC RBC-ENTMCNC: 35.3 GM/DL
MCV RBC AUTO: 88.9 FL
MONOCYTES # BLD AUTO: 0.41 K/UL
MONOCYTES NFR BLD AUTO: 5.4 %
NEUTROPHILS # BLD AUTO: 4.79 K/UL
NEUTROPHILS NFR BLD AUTO: 62.9 %
PLATELET # BLD AUTO: 153 K/UL
POTASSIUM SERPL-SCNC: 4.4 MMOL/L
PROT SERPL-MCNC: 8.1 G/DL
PSA SERPL-MCNC: 3.75 NG/ML
RBC # BLD: 5.04 M/UL
RBC # FLD: 13.2 %
SODIUM SERPL-SCNC: 140 MMOL/L
TIBC SERPL-MCNC: 331 UG/DL
TRIGL SERPL-MCNC: 146 MG/DL
TSH SERPL-ACNC: 2.56 UIU/ML
UIBC SERPL-MCNC: 239 UG/DL
VIT B12 SERPL-MCNC: 889 PG/ML
WBC # FLD AUTO: 7.62 K/UL

## 2018-09-13 ENCOUNTER — MEDICATION RENEWAL (OUTPATIENT)
Age: 73
End: 2018-09-13

## 2018-10-26 ENCOUNTER — MEDICATION RENEWAL (OUTPATIENT)
Age: 73
End: 2018-10-26

## 2018-12-09 NOTE — ED PROVIDER NOTE - PROGRESS NOTE DETAILS
Spontaneous, unlabored and symmetrical **ATTENDING ADDENDUM (Dr. Kendell Vega): patient serially evaluated throughout ED course. NO acute deterioration up to this time in the ED. In fact, patient reports marked improvement in pain following the administration of analgesics. CT and other ED diagnostics noted. Urology consultation obtained and acknowledged. Agree with discharge home with close outpatient followup with primary care physician/provider and with medications (if appropriate, if clinically indicated, and as prescribed, as noted in EMR). Anticipatory guidance provided. Will continue to observe and monitor closely until definitive disposition is made. Urology has recommended flomax however the patient is allergic to glipizide, both of which are sulfa drugs. The patient cannot confirm whether he has taken flomax before without reaction so I instructed him to take his antibiotic, drink plenty of fluids, strain his urine and see Dr Metz at his appointment in 3 days. -[facility] Urology has recommended flomax however the patient is allergic to glipizide, both of which are sulfa drugs. The patient cannot confirm whether he has taken flomax before without reaction so I instructed him to take his antibiotic, drink plenty of fluids, strain his urine and see Dr Metz at his appointment in 3 days. -SM  **ATTENDING ADDENDUM (Dr. Kendell Vega): agree with above notation.

## 2019-01-28 ENCOUNTER — MEDICATION RENEWAL (OUTPATIENT)
Age: 74
End: 2019-01-28

## 2019-04-30 ENCOUNTER — MEDICATION RENEWAL (OUTPATIENT)
Age: 74
End: 2019-04-30

## 2019-04-30 ENCOUNTER — RX RENEWAL (OUTPATIENT)
Age: 74
End: 2019-04-30

## 2019-07-11 ENCOUNTER — NON-APPOINTMENT (OUTPATIENT)
Age: 74
End: 2019-07-11

## 2019-07-11 ENCOUNTER — APPOINTMENT (OUTPATIENT)
Dept: INTERNAL MEDICINE | Facility: CLINIC | Age: 74
End: 2019-07-11
Payer: MEDICARE

## 2019-07-11 VITALS
TEMPERATURE: 98.3 F | DIASTOLIC BLOOD PRESSURE: 68 MMHG | WEIGHT: 230 LBS | SYSTOLIC BLOOD PRESSURE: 134 MMHG | OXYGEN SATURATION: 97 % | HEART RATE: 82 BPM | HEIGHT: 75 IN | BODY MASS INDEX: 28.6 KG/M2

## 2019-07-11 PROCEDURE — 99214 OFFICE O/P EST MOD 30 MIN: CPT

## 2019-07-11 NOTE — PHYSICAL EXAM
[General Appearance - In No Acute Distress] : in no acute distress [General Appearance - Alert] : alert [General Appearance - Well Nourished] : well nourished [General Appearance - Well Developed] : well developed [General Appearance - Well-Appearing] : healthy appearing [Tympanic Membrane Erythematous Right] : was not red [Completely Obscured] : completely [Cerumen in Canal] : by cerumen [Left Tympanic Membrane Was Examined] : was normal [Auscultation Breath Sounds / Voice Sounds] : lungs were clear to auscultation bilaterally [Heart Rate And Rhythm] : heart rate was normal and rhythm regular [Heart Sounds] : normal S1 and S2 [Heart Sounds Pericardial Friction Rub] : no pericardial rub [Heart Sounds Gallop] : no gallops [Murmurs] : no murmurs [Abdomen Tenderness] : non-tender [Abdomen Soft] : soft [Bowel Sounds] : normal bowel sounds [Abdomen Mass (___ Cm)] : no abdominal mass palpated [] : no hepato-splenomegaly [Involuntary Movements] : no involuntary movements were seen [Motor Tone] : muscle strength and tone were normal [Oriented To Time, Place, And Person] : oriented to person, place, and time [Impaired Insight] : insight and judgment were intact [Affect] : the affect was normal

## 2019-07-11 NOTE — DISCUSSION/SUMMARY
[FreeTextEntry1] : 73 year old man with hx DM-II, HTN, nephrolithiasis presents for follow-up, BP check.\par \par 1. HTN: repeated BP at end of visit and obtained 140/85; cont ACEi; pt reluctant to make any changes at this time - he says he can do better with his diet and exercise plan in the upcoming weeks\par \par 2. DM: last HbA1c 6.0 on 3/30/12; cont current med regimen; repeat HbA1c at next visit; consider d/c Sulfonylurea if remains stable at next visit; due for urine microalbumin studies, Pneumovax at next visit\par \par 3. Nephrolithiasis: stable; pt follows with  as well; following diet as per urology for uric acid stones

## 2019-07-11 NOTE — HISTORY OF PRESENT ILLNESS
[Diabetes Mellitus] : diabetes mellitus [Hypertension] : ~T hypertension [Doing Well] : doing well [Hypoglycemia] : the patient reports no symptomatic hypoglycemic epsidodes. [Metformin] : Metformin HCl [Sulfonylurea] : Sulfonylurea [Does not check] : The patient is not checking blood pressure at home [Compliant] : the patient is compliant with ~his/her~ medication regimen [Side Effects] : ~He/She~ denies medication side effects [ACE Inhibitor] : an ACE inhibitor [None] : The patient is currently asymptomatic [Polyuria] : no polyuria [Polydypsia] : no polydipsia [Healthy Diet] : ~He/She~ consumes a diverse and healthy diet [Weight Concerns] : ~He/She~ has weight concens [Regular Exercise] : ~He/She~ exercises regularly [Tobacco Use] : ~He/She~ does not use tobacco [Alcohol Use] : ~He/She~ denies alcohol use [Drug Abuse] : ~He/She~ denies drug use [Current on all items] : the patient is current on all diabetes management items.

## 2019-07-12 LAB
25(OH)D3 SERPL-MCNC: 22 NG/ML
ALBUMIN SERPL ELPH-MCNC: 4.4 G/DL
ALP BLD-CCNC: 86 U/L
ALT SERPL-CCNC: 16 U/L
ANION GAP SERPL CALC-SCNC: 17 MMOL/L
AST SERPL-CCNC: 13 U/L
BASOPHILS # BLD AUTO: 0.05 K/UL
BASOPHILS NFR BLD AUTO: 0.6 %
BILIRUB SERPL-MCNC: 1.4 MG/DL
BUN SERPL-MCNC: 32 MG/DL
CALCIUM SERPL-MCNC: 9.5 MG/DL
CHLORIDE SERPL-SCNC: 101 MMOL/L
CHOLEST SERPL-MCNC: 110 MG/DL
CHOLEST/HDLC SERPL: 3.6 RATIO
CO2 SERPL-SCNC: 21 MMOL/L
CREAT SERPL-MCNC: 1.62 MG/DL
CREAT SPEC-SCNC: 144 MG/DL
EOSINOPHIL # BLD AUTO: 0.08 K/UL
EOSINOPHIL NFR BLD AUTO: 1 %
ESTIMATED AVERAGE GLUCOSE: 232 MG/DL
GLUCOSE SERPL-MCNC: 405 MG/DL
HBA1C MFR BLD HPLC: 9.7 %
HCT VFR BLD CALC: 47.2 %
HDLC SERPL-MCNC: 31 MG/DL
HGB BLD-MCNC: 15.2 G/DL
IMM GRANULOCYTES NFR BLD AUTO: 0.3 %
LDLC SERPL CALC-MCNC: 45 MG/DL
LYMPHOCYTES # BLD AUTO: 2 K/UL
LYMPHOCYTES NFR BLD AUTO: 25.5 %
MAN DIFF?: NORMAL
MCHC RBC-ENTMCNC: 30.3 PG
MCHC RBC-ENTMCNC: 32.2 GM/DL
MCV RBC AUTO: 94 FL
MICROALBUMIN 24H UR DL<=1MG/L-MCNC: 1.8 MG/DL
MICROALBUMIN/CREAT 24H UR-RTO: 13 MG/G
MONOCYTES # BLD AUTO: 0.48 K/UL
MONOCYTES NFR BLD AUTO: 6.1 %
NEUTROPHILS # BLD AUTO: 5.2 K/UL
NEUTROPHILS NFR BLD AUTO: 66.5 %
PLATELET # BLD AUTO: 139 K/UL
POTASSIUM SERPL-SCNC: 4.4 MMOL/L
PROT SERPL-MCNC: 7.3 G/DL
PSA SERPL-MCNC: 3.42 NG/ML
RBC # BLD: 5.02 M/UL
RBC # FLD: 13.6 %
SODIUM SERPL-SCNC: 139 MMOL/L
TRIGL SERPL-MCNC: 172 MG/DL
TSH SERPL-ACNC: 1.75 UIU/ML
WBC # FLD AUTO: 7.83 K/UL

## 2019-08-14 RX ORDER — INSULIN GLARGINE 300 U/ML
300 INJECTION, SOLUTION SUBCUTANEOUS DAILY
Qty: 1 | Refills: 3 | Status: ACTIVE | COMMUNITY
Start: 2019-08-14

## 2019-08-14 RX ORDER — SODIUM BICARBONATE 650 MG/1
650 TABLET ORAL TWICE DAILY
Qty: 180 | Refills: 0 | Status: ACTIVE | COMMUNITY
Start: 2019-08-14

## 2019-08-14 RX ORDER — LOSARTAN POTASSIUM 100 MG/1
100 TABLET, FILM COATED ORAL DAILY
Qty: 90 | Refills: 0 | Status: ACTIVE | COMMUNITY
Start: 2019-08-14

## 2019-10-02 ENCOUNTER — APPOINTMENT (OUTPATIENT)
Dept: INTERNAL MEDICINE | Facility: CLINIC | Age: 74
End: 2019-10-02
Payer: MEDICARE

## 2019-10-02 VITALS
BODY MASS INDEX: 28.6 KG/M2 | TEMPERATURE: 98.6 F | HEART RATE: 64 BPM | OXYGEN SATURATION: 98 % | DIASTOLIC BLOOD PRESSURE: 78 MMHG | SYSTOLIC BLOOD PRESSURE: 122 MMHG | WEIGHT: 230 LBS | HEIGHT: 75 IN

## 2019-10-02 DIAGNOSIS — Z13.31 ENCOUNTER FOR SCREENING FOR DEPRESSION: ICD-10-CM

## 2019-10-02 DIAGNOSIS — Z13.39 ENCOUNTER FOR SCREENING EXAM FOR OTHER MENTAL HEALTH AND BEHAVIORAL DISORDERS: ICD-10-CM

## 2019-10-02 DIAGNOSIS — Z87.898 PERSONAL HISTORY OF OTHER SPECIFIED CONDITIONS: ICD-10-CM

## 2019-10-02 DIAGNOSIS — C43.9 MALIGNANT MELANOMA OF SKIN, UNSPECIFIED: ICD-10-CM

## 2019-10-02 DIAGNOSIS — H90.41 SENSORINEURAL HEARING LOSS, UNILATERAL, RIGHT EAR, WITH UNRESTRICTED HEARING ON THE CONTRALATERAL SIDE: ICD-10-CM

## 2019-10-02 DIAGNOSIS — Z88.9 ALLERGY STATUS TO UNSPECIFIED DRUGS, MEDICAMENTS AND BIOLOGICAL SUBSTANCES: ICD-10-CM

## 2019-10-02 DIAGNOSIS — B35.1 TINEA UNGUIUM: ICD-10-CM

## 2019-10-02 PROCEDURE — G0439: CPT

## 2019-10-02 PROCEDURE — 99214 OFFICE O/P EST MOD 30 MIN: CPT | Mod: 25

## 2019-10-02 PROCEDURE — G0444 DEPRESSION SCREEN ANNUAL: CPT | Mod: 59

## 2019-10-02 PROCEDURE — G0442 ANNUAL ALCOHOL SCREEN 15 MIN: CPT | Mod: 59

## 2019-10-02 NOTE — PHYSICAL EXAM
[No Acute Distress] : no acute distress [Well Nourished] : well nourished [Well Developed] : well developed [Well-Appearing] : well-appearing [Normal Sclera/Conjunctiva] : normal sclera/conjunctiva [PERRL] : pupils equal round and reactive to light [EOMI] : extraocular movements intact [Normal Outer Ear/Nose] : the outer ears and nose were normal in appearance [Normal Oropharynx] : the oropharynx was normal [No JVD] : no jugular venous distention [No Lymphadenopathy] : no lymphadenopathy [Supple] : supple [Thyroid Normal, No Nodules] : the thyroid was normal and there were no nodules present [No Respiratory Distress] : no respiratory distress  [No Accessory Muscle Use] : no accessory muscle use [Clear to Auscultation] : lungs were clear to auscultation bilaterally [Normal Rate] : normal rate  [Regular Rhythm] : with a regular rhythm [Normal S1, S2] : normal S1 and S2 [No Murmur] : no murmur heard [No Carotid Bruits] : no carotid bruits [No Abdominal Bruit] : a ~M bruit was not heard ~T in the abdomen [No Varicosities] : no varicosities [No Edema] : there was no peripheral edema [Pedal Pulses Present] : the pedal pulses are present [No Palpable Aorta] : no palpable aorta [No Extremity Clubbing/Cyanosis] : no extremity clubbing/cyanosis [Soft] : abdomen soft [Non Tender] : non-tender [Non-distended] : non-distended [No Masses] : no abdominal mass palpated [No HSM] : no HSM [Normal Bowel Sounds] : normal bowel sounds [Prostate Enlargement] : the prostate was not enlarged [Prostate Tenderness] : the prostate was not tender [No Prostate Nodules] : no prostate nodules [Normal Posterior Cervical Nodes] : no posterior cervical lymphadenopathy [Normal Anterior Cervical Nodes] : no anterior cervical lymphadenopathy [No CVA Tenderness] : no CVA  tenderness [No Spinal Tenderness] : no spinal tenderness [No Joint Swelling] : no joint swelling [Grossly Normal Strength/Tone] : grossly normal strength/tone [No Rash] : no rash [Coordination Grossly Intact] : coordination grossly intact [No Focal Deficits] : no focal deficits [Normal Gait] : normal gait [Deep Tendon Reflexes (DTR)] : deep tendon reflexes were 2+ and symmetric [Normal Affect] : the affect was normal [Normal Insight/Judgement] : insight and judgment were intact

## 2019-10-02 NOTE — HISTORY OF PRESENT ILLNESS
[FreeTextEntry1] : Presents for preventive visit as well as concerns regarding hypertension, malignant skin melanoma, onychomycosis of the toenails, uncontrolled diabetes and vitamin D deficiency. [de-identified] : \par Preventive visit: pt is up to date with vaccines; he is up to date with colonoscopy screening; he is due for prostate cancer screening today via [facility] and PSA testing; he does not smoke cigarettes and does not misuse alcohol; he feels safe at home and has smoke/CO detectors in the house; he wears seatbelts when in vehicles\par \par Medical Issue 1: Hypertension: unstable and variably controlled with fluctuations in BP levels from low to high; this is complicated by the recall in Valsartan that he had been taking for many years with stability; due to the recall, he is now on Losartan; he denies medication side effects, headache, chest pain and dizziness\par \par Medical Issue 2: Malignant skin melanoma: stable and under control; follows with dermatologist for exams; he denies unintentional weight changes, night sweats and new skin lesions\par \par Medical Issue 3: Onychomycosis of the toenails: unstable and poorly controlled; now with worsening in both feet; he has seen complications of nail breakdown and chipping very easily; he denies foot and toe pain\par \par Medical Issue 4: Uncontrolled diabetes: unstable and poorly controlled; he is not always compliant with medications and his diet is variable; he had Chinese food with rice earlier today; but he tries as best as possible to limit carbs in his diet; he exercises regularly to maintain his weight; he denies polyuria and polydipsia\par \par Medical Issue 5: Vitamin D deficiency: stable and controlled with supplementation; he denies bone pain and weakness

## 2019-10-02 NOTE — HEALTH RISK ASSESSMENT
[Good] : ~his/her~  mood as  good [No] : No [1 or 2 (0 pts)] : 1 or 2 (0 points) [Never (0 pts)] : Never (0 points) [No falls in past year] : Patient reported no falls in the past year [None] : None [With Significant Other] : lives with significant other [Retired] : retired [] : No [Audit-CScore] : 0 [Change in mental status noted] : No change in mental status noted [Language] : denies difficulty with language [Behavior] : denies difficulty with behavior [Learning/Retaining New Information] : denies difficulty learning/retaining new information [Handling Complex Tasks] : denies difficulty handling complex tasks [Reasoning] : denies difficulty with reasoning [Spatial Ability and Orientation] : denies difficulty with spatial ability and orientation [Single] : single [Sexually Active] : sexually active [High Risk Behavior] : no high risk behavior [Feels Safe at Home] : Feels safe at home [Fully functional (bathing, dressing, toileting, transferring, walking, feeding)] : Fully functional (bathing, dressing, toileting, transferring, walking, feeding) [Fully functional (using the telephone, shopping, preparing meals, housekeeping, doing laundry, using] : Fully functional and needs no help or supervision to perform IADLs (using the telephone, shopping, preparing meals, housekeeping, doing laundry, using transportation, managing medications and managing finances) [Reports changes in hearing] : Reports no changes in hearing [Reports changes in vision] : Reports no changes in vision [Reports normal functional visual acuity (ie: able to read med bottle)] : Reports normal functional visual acuity [Reports changes in dental health] : Reports no changes in dental health [Smoke Detector] : smoke detector [Carbon Monoxide Detector] : carbon monoxide detector [Guns at Home] : no guns at home [Safety elements used in home] : safety elements used in home [Seat Belt] :  uses seat belt [Sunscreen] : uses sunscreen [Travel to Developing Areas] : does not  travel to developing areas [Caregiver Concerns] : does not have caregiver concerns [TB Exposure] : is not being exposed to tuberculosis [Reviewed no changes] : Reviewed no changes [AdvancecareDate] : 09/19

## 2019-10-02 NOTE — ASSESSMENT
[FreeTextEntry1] : \par Preventive visit: pt is up to date with vaccines; he is up to date with colonoscopy screening; prostate cancer screening done today via [facility] and PSA testing; praised pt's tobacco-free lifestyle; encouraged use of smoke/CO detectors in the house and use of seatbelts when in vehicles; offered flu vaccine but patient declined\par \par Medical Issue 1: Hypertension: unstable and variably controlled with fluctuations in BP levels from low to high; this is complicated by the recall in Valsartan that he had been taking for many years with stability; due to the recall, he is now on Losartan; check renal panel and electrolytes via blood draw today; encouraged low-sodium diet\par \par Medical Issue 2: Malignant skin melanoma: stable and under control; follows with dermatologist for exams; he denies unintentional weight changes, night sweats and new skin lesions; coordinated follow-up with derm\[facility] \[facility] Medical Issue 3: Onychomycosis of the toenails: unstable and poorly controlled; now with worsening in both feet; he has seen complications of nail breakdown and chipping very easily; prescribed Penlac topical and coordinated follow-up care with podiatrist\[facility] \par Medical Issue 4: Uncontrolled diabetes: unstable and poorly controlled; he is not always compliant with medications and his diet is variable; check A1c and urine studies today; coordinated follow-up with Endo and diabetes educator\[facility] \par Medical Issue 5: Vitamin D deficiency: stable and controlled with supplementation; he denies bone pain and weakness; check Vit D level via blood draw today\par \par Depression screen performed today, PHQ2=0\par \par Annual alcohol misuse screen, 15 mins, done; negative

## 2019-10-04 LAB
25(OH)D3 SERPL-MCNC: 22.3 NG/ML
ALBUMIN SERPL ELPH-MCNC: 4.7 G/DL
ALP BLD-CCNC: 94 U/L
ALT SERPL-CCNC: 20 U/L
ANION GAP SERPL CALC-SCNC: 13 MMOL/L
AST SERPL-CCNC: 16 U/L
BASOPHILS # BLD AUTO: 0.05 K/UL
BASOPHILS NFR BLD AUTO: 0.6 %
BILIRUB SERPL-MCNC: 1.2 MG/DL
BUN SERPL-MCNC: 25 MG/DL
CALCIUM SERPL-MCNC: 9.8 MG/DL
CHLORIDE SERPL-SCNC: 99 MMOL/L
CHOLEST SERPL-MCNC: 130 MG/DL
CHOLEST/HDLC SERPL: 3.4 RATIO
CO2 SERPL-SCNC: 27 MMOL/L
CREAT SERPL-MCNC: 1.56 MG/DL
EOSINOPHIL # BLD AUTO: 0.08 K/UL
EOSINOPHIL NFR BLD AUTO: 1 %
ESTIMATED AVERAGE GLUCOSE: 206 MG/DL
GLUCOSE SERPL-MCNC: 326 MG/DL
HBA1C MFR BLD HPLC: 8.8 %
HCT VFR BLD CALC: 47.2 %
HDLC SERPL-MCNC: 38 MG/DL
HGB BLD-MCNC: 15.9 G/DL
IMM GRANULOCYTES NFR BLD AUTO: 0.1 %
LDLC SERPL CALC-MCNC: 60 MG/DL
LYMPHOCYTES # BLD AUTO: 2.11 K/UL
LYMPHOCYTES NFR BLD AUTO: 26.1 %
MAN DIFF?: NORMAL
MCHC RBC-ENTMCNC: 31.2 PG
MCHC RBC-ENTMCNC: 33.7 GM/DL
MCV RBC AUTO: 92.5 FL
MONOCYTES # BLD AUTO: 0.55 K/UL
MONOCYTES NFR BLD AUTO: 6.8 %
NEUTROPHILS # BLD AUTO: 5.28 K/UL
NEUTROPHILS NFR BLD AUTO: 65.4 %
PLATELET # BLD AUTO: 166 K/UL
POTASSIUM SERPL-SCNC: 4.3 MMOL/L
PROT SERPL-MCNC: 7.8 G/DL
RBC # BLD: 5.1 M/UL
RBC # FLD: 13.2 %
SODIUM SERPL-SCNC: 139 MMOL/L
TRIGL SERPL-MCNC: 160 MG/DL
TSH SERPL-ACNC: 3.11 UIU/ML
WBC # FLD AUTO: 8.08 K/UL

## 2019-12-03 ENCOUNTER — RX RENEWAL (OUTPATIENT)
Age: 74
End: 2019-12-03

## 2020-02-20 RX ORDER — ATORVASTATIN CALCIUM 40 MG/1
40 TABLET, FILM COATED ORAL
Qty: 90 | Refills: 0 | Status: ACTIVE | COMMUNITY
Start: 1900-01-01 | End: 1900-01-01

## 2020-05-21 ENCOUNTER — TRANSCRIPTION ENCOUNTER (OUTPATIENT)
Age: 75
End: 2020-05-21

## 2020-08-11 NOTE — ED ADULT TRIAGE NOTE - CCCP TRG CHIEF CMPLNT
Patient evaluated, treated, and discharged by PA. Refer to PA note for assessment. Discharge instructions provided
tba kidney stones

## 2020-08-14 ENCOUNTER — APPOINTMENT (OUTPATIENT)
Dept: CT IMAGING | Facility: CLINIC | Age: 75
End: 2020-08-14
Payer: MEDICARE

## 2020-08-14 ENCOUNTER — OUTPATIENT (OUTPATIENT)
Dept: OUTPATIENT SERVICES | Facility: HOSPITAL | Age: 75
LOS: 1 days | End: 2020-08-14
Payer: MEDICARE

## 2020-08-14 DIAGNOSIS — N20.0 CALCULUS OF KIDNEY: ICD-10-CM

## 2020-08-14 DIAGNOSIS — Q62.10 CONGENITAL OCCLUSION OF URETER, UNSPECIFIED: Chronic | ICD-10-CM

## 2020-08-14 PROCEDURE — 74176 CT ABD & PELVIS W/O CONTRAST: CPT

## 2020-08-14 PROCEDURE — 74176 CT ABD & PELVIS W/O CONTRAST: CPT | Mod: 26

## 2020-09-23 ENCOUNTER — APPOINTMENT (OUTPATIENT)
Dept: INTERNAL MEDICINE | Facility: CLINIC | Age: 75
End: 2020-09-23
Payer: MEDICARE

## 2020-09-23 VITALS
HEART RATE: 88 BPM | HEIGHT: 75 IN | OXYGEN SATURATION: 96 % | DIASTOLIC BLOOD PRESSURE: 81 MMHG | TEMPERATURE: 97.5 F | WEIGHT: 231 LBS | BODY MASS INDEX: 28.72 KG/M2 | SYSTOLIC BLOOD PRESSURE: 143 MMHG

## 2020-09-23 DIAGNOSIS — E55.9 VITAMIN D DEFICIENCY, UNSPECIFIED: ICD-10-CM

## 2020-09-23 PROCEDURE — 99213 OFFICE O/P EST LOW 20 MIN: CPT

## 2020-09-23 PROCEDURE — G0438: CPT

## 2020-09-29 NOTE — PHYSICAL EXAM
[No Acute Distress] : no acute distress [Well Nourished] : well nourished [Well Developed] : well developed [Well-Appearing] : well-appearing [Normal Sclera/Conjunctiva] : normal sclera/conjunctiva [PERRL] : pupils equal round and reactive to light [EOMI] : extraocular movements intact [Normal Outer Ear/Nose] : the outer ears and nose were normal in appearance [Normal Oropharynx] : the oropharynx was normal [No JVD] : no jugular venous distention [No Lymphadenopathy] : no lymphadenopathy [Supple] : supple [Thyroid Normal, No Nodules] : the thyroid was normal and there were no nodules present [No Respiratory Distress] : no respiratory distress  [No Accessory Muscle Use] : no accessory muscle use [Clear to Auscultation] : lungs were clear to auscultation bilaterally [Normal Rate] : normal rate  [Regular Rhythm] : with a regular rhythm [Normal S1, S2] : normal S1 and S2 [No Murmur] : no murmur heard [No Carotid Bruits] : no carotid bruits [No Abdominal Bruit] : a ~M bruit was not heard ~T in the abdomen [No Varicosities] : no varicosities [Pedal Pulses Present] : the pedal pulses are present [No Edema] : there was no peripheral edema [No Palpable Aorta] : no palpable aorta [No Extremity Clubbing/Cyanosis] : no extremity clubbing/cyanosis [Soft] : abdomen soft [Non Tender] : non-tender [Non-distended] : non-distended [No Masses] : no abdominal mass palpated [No HSM] : no HSM [Normal Bowel Sounds] : normal bowel sounds [Prostate Enlargement] : the prostate was not enlarged [Prostate Tenderness] : the prostate was not tender [No Prostate Nodules] : no prostate nodules [Normal Posterior Cervical Nodes] : no posterior cervical lymphadenopathy [Normal Anterior Cervical Nodes] : no anterior cervical lymphadenopathy [No CVA Tenderness] : no CVA  tenderness [No Spinal Tenderness] : no spinal tenderness [No Joint Swelling] : no joint swelling [Grossly Normal Strength/Tone] : grossly normal strength/tone [No Rash] : no rash [Coordination Grossly Intact] : coordination grossly intact [No Focal Deficits] : no focal deficits [Normal Gait] : normal gait [Deep Tendon Reflexes (DTR)] : deep tendon reflexes were 2+ and symmetric [Normal Affect] : the affect was normal [Normal Insight/Judgement] : insight and judgment were intact

## 2020-10-01 LAB
25(OH)D3 SERPL-MCNC: 22.8 NG/ML
ALBUMIN SERPL ELPH-MCNC: 4.4 G/DL
ALP BLD-CCNC: 106 U/L
ALT SERPL-CCNC: 13 U/L
ANION GAP SERPL CALC-SCNC: 13 MMOL/L
AST SERPL-CCNC: 19 U/L
BASOPHILS # BLD AUTO: 0.03 K/UL
BASOPHILS NFR BLD AUTO: 0.4 %
BILIRUB SERPL-MCNC: 1 MG/DL
BUN SERPL-MCNC: 28 MG/DL
CALCIUM SERPL-MCNC: 9.7 MG/DL
CHLORIDE SERPL-SCNC: 101 MMOL/L
CHOLEST SERPL-MCNC: 120 MG/DL
CHOLEST/HDLC SERPL: 3.1 RATIO
CO2 SERPL-SCNC: 23 MMOL/L
CREAT SERPL-MCNC: 1.54 MG/DL
EOSINOPHIL # BLD AUTO: 0.1 K/UL
EOSINOPHIL NFR BLD AUTO: 1.3 %
ESTIMATED AVERAGE GLUCOSE: 220 MG/DL
GLUCOSE SERPL-MCNC: 311 MG/DL
HBA1C MFR BLD HPLC: 9.3 %
HCT VFR BLD CALC: 46.4 %
HDLC SERPL-MCNC: 38 MG/DL
HGB BLD-MCNC: 15.7 G/DL
HIV1+2 AB SPEC QL IA.RAPID: NONREACTIVE
IMM GRANULOCYTES NFR BLD AUTO: 0.1 %
LDLC SERPL CALC-MCNC: 61 MG/DL
LYMPHOCYTES # BLD AUTO: 2.24 K/UL
LYMPHOCYTES NFR BLD AUTO: 29.7 %
MAN DIFF?: NORMAL
MCHC RBC-ENTMCNC: 30.8 PG
MCHC RBC-ENTMCNC: 33.8 GM/DL
MCV RBC AUTO: 91.2 FL
MONOCYTES # BLD AUTO: 0.46 K/UL
MONOCYTES NFR BLD AUTO: 6.1 %
NEUTROPHILS # BLD AUTO: 4.71 K/UL
NEUTROPHILS NFR BLD AUTO: 62.4 %
PLATELET # BLD AUTO: 148 K/UL
POTASSIUM SERPL-SCNC: 4.3 MMOL/L
PROT SERPL-MCNC: 7.2 G/DL
PSA SERPL-MCNC: 4.67 NG/ML
RBC # BLD: 5.09 M/UL
RBC # FLD: 13.1 %
SARS-COV-2 IGG SERPL IA-ACNC: 0.09 INDEX
SARS-COV-2 IGG SERPL QL IA: NEGATIVE
SODIUM SERPL-SCNC: 138 MMOL/L
TRIGL SERPL-MCNC: 102 MG/DL
TSH SERPL-ACNC: 2.06 UIU/ML
WBC # FLD AUTO: 7.55 K/UL

## 2021-01-05 ENCOUNTER — APPOINTMENT (OUTPATIENT)
Dept: INTERNAL MEDICINE | Facility: CLINIC | Age: 76
End: 2021-01-05

## 2021-01-06 NOTE — HISTORY OF PRESENT ILLNESS
[FreeTextEntry1] : Presents for preventive visit as well as concerns regarding hypertension, malignant skin melanoma, onychomycosis of the toenails, uncontrolled diabetes and vitamin D deficiency. [de-identified] : \par Preventive visit: pt is up to date with vaccines; he is up to date with colonoscopy screening; he is due for prostate cancer screening today via [facility] and PSA testing; he does not smoke cigarettes and does not misuse alcohol; he feels safe at home and has smoke/CO detectors in the house; he wears seatbelts when in vehicles\par \par Medical Issue 1: Hypertension: unstable and variably controlled with fluctuations in BP levels from low to high; this is complicated by the recall in Valsartan that he had been taking for many years with stability; due to the recall, he is now on Losartan; he denies medication side effects, headache, chest pain and dizziness\par \par Medical Issue 2: Malignant skin melanoma: stable and under control; follows with dermatologist for exams; he denies unintentional weight changes, night sweats and new skin lesions\par \par Medical Issue 3: Onychomycosis of the toenails: unstable and poorly controlled; now with worsening in both feet; he has seen complications of nail breakdown and chipping very easily; he denies foot and toe pain\par \par Medical Issue 4: Uncontrolled diabetes: unstable and poorly controlled; he is not always compliant with medications and his diet is variable; he had Chinese food with rice earlier today; but he tries as best as possible to limit carbs in his diet; he exercises regularly to maintain his weight; he denies polyuria and polydipsia\par \par Medical Issue 5: Vitamin D deficiency: stable and controlled with supplementation; he denies bone pain and weakness

## 2021-01-06 NOTE — HEALTH RISK ASSESSMENT
[Good] : ~his/her~  mood as  good [No] : No [1 or 2 (0 pts)] : 1 or 2 (0 points) [Never (0 pts)] : Never (0 points) [No falls in past year] : Patient reported no falls in the past year [None] : None [With Significant Other] : lives with significant other [Retired] : retired [Single] : single [Sexually Active] : sexually active [Feels Safe at Home] : Feels safe at home [Fully functional (bathing, dressing, toileting, transferring, walking, feeding)] : Fully functional (bathing, dressing, toileting, transferring, walking, feeding) [Fully functional (using the telephone, shopping, preparing meals, housekeeping, doing laundry, using] : Fully functional and needs no help or supervision to perform IADLs (using the telephone, shopping, preparing meals, housekeeping, doing laundry, using transportation, managing medications and managing finances) [Reports normal functional visual acuity (ie: able to read med bottle)] : Reports normal functional visual acuity [Smoke Detector] : smoke detector [Carbon Monoxide Detector] : carbon monoxide detector [Safety elements used in home] : safety elements used in home [Seat Belt] :  uses seat belt [Sunscreen] : uses sunscreen [Reviewed no changes] : Reviewed no changes [] : No [Audit-CScore] : 0 [Change in mental status noted] : No change in mental status noted [Language] : denies difficulty with language [Behavior] : denies difficulty with behavior [Learning/Retaining New Information] : denies difficulty learning/retaining new information [Handling Complex Tasks] : denies difficulty handling complex tasks [Reasoning] : denies difficulty with reasoning [Spatial Ability and Orientation] : denies difficulty with spatial ability and orientation [High Risk Behavior] : no high risk behavior [Reports changes in hearing] : Reports no changes in hearing [Reports changes in vision] : Reports no changes in vision [Reports changes in dental health] : Reports no changes in dental health [Guns at Home] : no guns at home [Travel to Developing Areas] : does not  travel to developing areas [TB Exposure] : is not being exposed to tuberculosis [Caregiver Concerns] : does not have caregiver concerns [AdvancecareDate] : 09/20

## 2021-01-06 NOTE — ASSESSMENT
[FreeTextEntry1] : \par Preventive visit: pt is up to date with vaccines; he is up to date with colonoscopy screening; prostate cancer screening done today via [facility] and PSA testing; praised pt's tobacco-free lifestyle; encouraged use of smoke/CO detectors in the house and use of seatbelts when in vehicles; offered flu vaccine but patient declined\[facility] \[facility] Medical Issue 1: Hypertension: unstable and variably controlled with fluctuations in BP levels from low to high; this is complicated by the recall in Valsartan that he had been taking for many years with stability; due to the recall, he is now on Losartan; check renal panel and electrolytes via blood draw today; encouraged low-sodium diet\[facility] \[facility] Medical Issue 2: Malignant skin melanoma: stable and under control; follows with dermatologist for exams; he denies unintentional weight changes, night sweats and new skin lesions; coordinated follow-up with derm\[facility] \[facility] Medical Issue 3: Onychomycosis of the toenails: unstable and poorly controlled; now with worsening in both feet; he has seen complications of nail breakdown and chipping very easily; prescribed Penlac topical and coordinated follow-up care with podiatrist\[facility] \[facility] Medical Issue 4: Uncontrolled diabetes: unstable and poorly controlled; he is not always compliant with medications and his diet is variable; check A1c and urine studies today; coordinated follow-up with Endo and diabetes educator\[facility] \[facility] Medical Issue 5: Vitamin D deficiency: stable and controlled with supplementation; he denies bone pain and weakness; check Vit D level via blood draw today

## 2021-01-12 ENCOUNTER — TRANSCRIPTION ENCOUNTER (OUTPATIENT)
Age: 76
End: 2021-01-12

## 2021-01-21 NOTE — BRIEF OPERATIVE NOTE - OPERATION/FINDINGS
[address] [facility]  [address] [facility] [address] [place] [address]  Phone: [phone]  Fax: [phone]    Stanislaw Sevilla MD        January 21, 2021       Patient: Latrice Hanks   MR Number: [id number]   YOB: 1964   Date of Visit: 1/21/2021       Dear Dr. Santino Vicente: Thank you for the request for consultation for Sima Tucker to me for the evaluation of lower back pain. Below are the relevant portions of my assessment and plan of care. If you have questions, please do not hesitate to call me. I look forward to following Javier Estrella along with you.     Sincerely,        Paolo Espana MD    CC providers:    No Recipients cystoscopy, L URS, laser lithotripsy, L RPG, placement of left ureteral stent

## 2021-08-27 RX ORDER — PEN NEEDLE, DIABETIC 29 G X1/2"
32G X 4 MM NEEDLE, DISPOSABLE MISCELLANEOUS
Qty: 1 | Refills: 3 | Status: ACTIVE | COMMUNITY
Start: 2017-05-24 | End: 1900-01-01

## 2021-11-19 ENCOUNTER — APPOINTMENT (OUTPATIENT)
Dept: INTERNAL MEDICINE | Facility: CLINIC | Age: 76
End: 2021-11-19
Payer: MEDICARE

## 2021-11-19 VITALS
HEART RATE: 65 BPM | WEIGHT: 232 LBS | SYSTOLIC BLOOD PRESSURE: 125 MMHG | DIASTOLIC BLOOD PRESSURE: 63 MMHG | HEIGHT: 75 IN | BODY MASS INDEX: 28.85 KG/M2 | TEMPERATURE: 97.3 F | OXYGEN SATURATION: 98 %

## 2021-11-19 PROCEDURE — G0439: CPT

## 2021-11-19 NOTE — HEALTH RISK ASSESSMENT
[Good] : ~his/her~  mood as  good [] : No [No] : No [1 or 2 (0 pts)] : 1 or 2 (0 points) [Never (0 pts)] : Never (0 points) [No falls in past year] : Patient reported no falls in the past year [Audit-CScore] : 0 [Change in mental status noted] : No change in mental status noted [Language] : denies difficulty with language [Behavior] : denies difficulty with behavior [Learning/Retaining New Information] : denies difficulty learning/retaining new information [Handling Complex Tasks] : denies difficulty handling complex tasks [Reasoning] : denies difficulty with reasoning [Spatial Ability and Orientation] : denies difficulty with spatial ability and orientation [None] : None [With Significant Other] : lives with significant other [Retired] : retired [Single] : single [Sexually Active] : sexually active [High Risk Behavior] : no high risk behavior [Feels Safe at Home] : Feels safe at home [Fully functional (bathing, dressing, toileting, transferring, walking, feeding)] : Fully functional (bathing, dressing, toileting, transferring, walking, feeding) [Fully functional (using the telephone, shopping, preparing meals, housekeeping, doing laundry, using] : Fully functional and needs no help or supervision to perform IADLs (using the telephone, shopping, preparing meals, housekeeping, doing laundry, using transportation, managing medications and managing finances) [Reports changes in hearing] : Reports no changes in hearing [Reports changes in vision] : Reports no changes in vision [Reports normal functional visual acuity (ie: able to read med bottle)] : Reports normal functional visual acuity [Reports changes in dental health] : Reports no changes in dental health [Smoke Detector] : smoke detector [Carbon Monoxide Detector] : carbon monoxide detector [Guns at Home] : no guns at home [Safety elements used in home] : safety elements used in home [Seat Belt] :  uses seat belt [Sunscreen] : uses sunscreen [Travel to Developing Areas] : does not  travel to developing areas [TB Exposure] : is not being exposed to tuberculosis [Caregiver Concerns] : does not have caregiver concerns

## 2021-11-19 NOTE — HISTORY OF PRESENT ILLNESS
[FreeTextEntry1] : Presents for preventive visit as well as concerns regarding hypertension, malignant skin melanoma, onychomycosis of the toenails, uncontrolled diabetes and vitamin D deficiency. [de-identified] : \par Preventive visit: pt is up to date with vaccines; he is up to date with colonoscopy screening; he is due for prostate cancer screening today via [facility] and PSA testing; he does not smoke cigarettes and does not misuse alcohol; he feels safe at home and has smoke/CO detectors in the house; he wears seatbelts when in vehicles\par \par Medical Issue 1: Hypertension: stable and variably controlled with fluctuations in BP levels from low to high; this is complicated by the recall in Valsartan that he had been taking for many years with stability; due to the recall, he is now on Losartan; he denies medication side effects, headache, chest pain and dizziness\par \par Medical Issue 2: Malignant skin melanoma: stable and under control; follows with dermatologist for exams; he denies unintentional weight changes, night sweats and new skin lesions\par \par Medical Issue 3: Onychomycosis of the toenails: stable and controlled; now with worsening in both feet; he has seen complications of nail breakdown and chipping very easily; he denies foot and toe pain\par \par Medical Issue 4: Diabetes: stable and controlled; he is not always compliant with medications and his diet is variable; he had Chinese food with rice earlier today; but he tries as best as possible to limit carbs in his diet; he exercises regularly to maintain his weight; he denies polyuria and polydipsia\par \par Medical Issue 5: Vitamin D deficiency: stable and controlled with supplementation; he denies bone pain and weakness

## 2021-11-19 NOTE — PHYSICAL EXAM
[Well Nourished] : well nourished [Well Developed] : well developed [Well-Appearing] : well-appearing [Normal Sclera/Conjunctiva] : normal sclera/conjunctiva [PERRL] : pupils equal round and reactive to light [EOMI] : extraocular movements intact [No JVD] : no jugular venous distention [No Lymphadenopathy] : no lymphadenopathy [Supple] : supple [Thyroid Normal, No Nodules] : the thyroid was normal and there were no nodules present [No Respiratory Distress] : no respiratory distress  [No Accessory Muscle Use] : no accessory muscle use [Clear to Auscultation] : lungs were clear to auscultation bilaterally [Normal Rate] : normal rate  [Regular Rhythm] : with a regular rhythm [Normal S1, S2] : normal S1 and S2 [No Murmur] : no murmur heard [No Carotid Bruits] : no carotid bruits [No Abdominal Bruit] : a ~M bruit was not heard ~T in the abdomen [No Varicosities] : no varicosities [Pedal Pulses Present] : the pedal pulses are present [No Edema] : there was no peripheral edema [No Palpable Aorta] : no palpable aorta [No Extremity Clubbing/Cyanosis] : no extremity clubbing/cyanosis [Soft] : abdomen soft [Non Tender] : non-tender [Non-distended] : non-distended [No Masses] : no abdominal mass palpated [No HSM] : no HSM [Normal Bowel Sounds] : normal bowel sounds [Prostate Enlargement] : the prostate was not enlarged [Prostate Tenderness] : the prostate was not tender [No Prostate Nodules] : no prostate nodules [Normal Posterior Cervical Nodes] : no posterior cervical lymphadenopathy [Normal Anterior Cervical Nodes] : no anterior cervical lymphadenopathy [No CVA Tenderness] : no CVA  tenderness [No Spinal Tenderness] : no spinal tenderness [No Joint Swelling] : no joint swelling [Grossly Normal Strength/Tone] : grossly normal strength/tone [No Rash] : no rash [Coordination Grossly Intact] : coordination grossly intact [No Focal Deficits] : no focal deficits [Normal Gait] : normal gait [Deep Tendon Reflexes (DTR)] : deep tendon reflexes were 2+ and symmetric [Normal Affect] : the affect was normal [Normal Insight/Judgement] : insight and judgment were intact

## 2021-11-19 NOTE — ASSESSMENT
[FreeTextEntry1] : \[facility] Preventive visit: pt is up to date with vaccines; he is up to date with colonoscopy screening; prostate cancer screening done today via [facility] and PSA testing; praised pt's tobacco-free lifestyle; encouraged use of smoke/CO detectors in the house and use of seatbelts when in vehicles; offered flu vaccine but patient declined\[facility] \[facility] Medical Issue 1: Hypertension: stable and variably controlled with fluctuations in BP levels from low to high; this is complicated by the recall in Valsartan that he had been taking for many years with stability; due to the recall, he is now on Losartan; check renal panel and electrolytes via blood draw today; encouraged low-sodium diet\[facility] \[facility] Medical Issue 2: Malignant skin melanoma: stable and under control; follows with dermatologist for exams; he denies unintentional weight changes, night sweats and new skin lesions; coordinated follow-up with derm\[facility] Medical Issue 3: Onychomycosis of the toenails: stable and controlled; now with worsening in both feet; he has seen complications of nail breakdown and chipping very easily; prescribed Penlac topical and coordinated follow-up care with podiatrist\[facility] \[facility] Medical Issue 4: Diabetes: stable and controlled; he is not always compliant with medications and his diet is variable; check A1c and urine studies today; coordinated follow-up with Endo and diabetes educator\[facility] \[facility] Medical Issue 5: Vitamin D deficiency: stable and controlled with supplementation; he denies bone pain and weakness; check Vit D level via blood draw today

## 2021-11-22 LAB
25(OH)D3 SERPL-MCNC: 21.1 NG/ML
ALBUMIN SERPL ELPH-MCNC: 4.5 G/DL
ALP BLD-CCNC: 101 U/L
ALT SERPL-CCNC: 18 U/L
ANION GAP SERPL CALC-SCNC: 15 MMOL/L
AST SERPL-CCNC: 15 U/L
BASOPHILS # BLD AUTO: 0.04 K/UL
BASOPHILS NFR BLD AUTO: 0.5 %
BILIRUB SERPL-MCNC: 1.2 MG/DL
BUN SERPL-MCNC: 26 MG/DL
CALCIUM SERPL-MCNC: 9.5 MG/DL
CHLORIDE SERPL-SCNC: 103 MMOL/L
CHOLEST SERPL-MCNC: 118 MG/DL
CO2 SERPL-SCNC: 22 MMOL/L
CREAT SERPL-MCNC: 1.59 MG/DL
CREAT SPEC-SCNC: 172 MG/DL
EOSINOPHIL # BLD AUTO: 0.08 K/UL
EOSINOPHIL NFR BLD AUTO: 1.1 %
ESTIMATED AVERAGE GLUCOSE: 223 MG/DL
GLUCOSE SERPL-MCNC: 342 MG/DL
HBA1C MFR BLD HPLC: 9.4 %
HCT VFR BLD CALC: 43.9 %
HDLC SERPL-MCNC: 39 MG/DL
HGB BLD-MCNC: 15.3 G/DL
IMM GRANULOCYTES NFR BLD AUTO: 0.3 %
LDLC SERPL CALC-MCNC: 52 MG/DL
LYMPHOCYTES # BLD AUTO: 1.83 K/UL
LYMPHOCYTES NFR BLD AUTO: 24.2 %
MAN DIFF?: NORMAL
MCHC RBC-ENTMCNC: 31.5 PG
MCHC RBC-ENTMCNC: 34.9 GM/DL
MCV RBC AUTO: 90.3 FL
MICROALBUMIN 24H UR DL<=1MG/L-MCNC: 3.2 MG/DL
MICROALBUMIN/CREAT 24H UR-RTO: 19 MG/G
MONOCYTES # BLD AUTO: 0.39 K/UL
MONOCYTES NFR BLD AUTO: 5.2 %
NEUTROPHILS # BLD AUTO: 5.19 K/UL
NEUTROPHILS NFR BLD AUTO: 68.7 %
NONHDLC SERPL-MCNC: 79 MG/DL
PLATELET # BLD AUTO: 155 K/UL
POTASSIUM SERPL-SCNC: 4.5 MMOL/L
PROT SERPL-MCNC: 7.4 G/DL
PSA SERPL-MCNC: 4.05 NG/ML
RBC # BLD: 4.86 M/UL
RBC # FLD: 12.3 %
SODIUM SERPL-SCNC: 141 MMOL/L
TRIGL SERPL-MCNC: 134 MG/DL
TSH SERPL-ACNC: 2.74 UIU/ML
WBC # FLD AUTO: 7.55 K/UL

## 2021-11-30 ENCOUNTER — OUTPATIENT (OUTPATIENT)
Dept: OUTPATIENT SERVICES | Facility: HOSPITAL | Age: 76
LOS: 1 days | End: 2021-11-30
Payer: MEDICARE

## 2021-11-30 ENCOUNTER — APPOINTMENT (OUTPATIENT)
Dept: MRI IMAGING | Facility: CLINIC | Age: 76
End: 2021-11-30
Payer: MEDICARE

## 2021-11-30 DIAGNOSIS — Z00.8 ENCOUNTER FOR OTHER GENERAL EXAMINATION: ICD-10-CM

## 2021-11-30 DIAGNOSIS — Q62.10 CONGENITAL OCCLUSION OF URETER, UNSPECIFIED: Chronic | ICD-10-CM

## 2021-11-30 PROCEDURE — 73721 MRI JNT OF LWR EXTRE W/O DYE: CPT | Mod: 26,LT,MH

## 2021-11-30 PROCEDURE — 73721 MRI JNT OF LWR EXTRE W/O DYE: CPT | Mod: MH

## 2022-01-01 NOTE — PROGRESS NOTE ADULT - SUBJECTIVE AND OBJECTIVE BOX
Subjective    Patient seen and examined. Notes some mild flank pain, but helped with medicine. No fevers. Tolerating diet.     Objective    Vital signs  T(F): , Max: 99.3 (10-18-17 @ 12:40)  HR: 71 (10-19-17 @ 01:12)  BP: 142/80 (10-19-17 @ 01:12)  SpO2: 96% (10-19-17 @ 01:12)  Wt(kg): --    Output     10-17 @ 07:01  -  10-18 @ 07:00  --------------------------------------------------------  IN: 0 mL / OUT: 600 mL / NET: -600 mL    10-18 @ 07:01  -  10-19 @ 06:01  --------------------------------------------------------  IN: 920 mL / OUT: 2450 mL / NET: -1530 mL        General: NAD  Abdomen: soft/non-tender/non-distended  : [facility] urine dark red, draining.     Labs      10-17 @ 17:23    WBC 9.2   / Hct 43.2  / SCr 2.40
Subjective    Patient seen and examined. Poor night last night, with pain and no sleep. NPO.    Objective    Vital signs  T(F): , Max: 98.6 (10-17-17 @ 14:56)  HR: 726 (10-18-17 @ 06:27)  BP: 134/71 (10-18-17 @ 06:27)  SpO2: 94% (10-18-17 @ 06:27)  Wt(kg): --    Output     10-17 @ 07:01  -  10-18 @ 06:54  --------------------------------------------------------  IN: 0 mL / OUT: 600 mL / NET: -600 mL        General: NAD  Abdomen: soft/non-tender/non-distended      Labs      10-17 @ 17:23    WBC 9.2   / Hct 43.2  / SCr 2.40
Subjective  complains of penile pain and does not want to go home with [facility]ina   Objective    Vital signs  T(F): , Max: 99.3 (10-18-17 @ 12:40)  HR: 71 (10-18-17 @ 16:58)  BP: 164/106 (10-18-17 @ 16:58)  SpO2: 96% (10-18-17 @ 16:58)  Wt(kg): --    Output     10-17 @ 07:01  -  10-18 @ 07:00  --------------------------------------------------------  IN: 0 mL / OUT: 600 mL / NET: -600 mL    10-18 @ 07:01  -  10-18 @ 18:18  --------------------------------------------------------  IN: 560 mL / OUT: 850 mL / NET: -290 mL        Gen awake alert nad axox3  Abd soft ntnd   Back no cvat bl    urine orange     Labs      10-17 @ 17:23    WBC 9.2   / Hct 43.2  / SCr 2.40
no one

## 2022-01-31 NOTE — DISCHARGE NOTE ADULT - DISCHARGE TO

## 2022-03-14 ENCOUNTER — RX RENEWAL (OUTPATIENT)
Age: 77
End: 2022-03-14

## 2022-03-15 ENCOUNTER — APPOINTMENT (OUTPATIENT)
Dept: INTERNAL MEDICINE | Facility: CLINIC | Age: 77
End: 2022-03-15
Payer: MEDICARE

## 2022-03-15 VITALS
HEART RATE: 63 BPM | BODY MASS INDEX: 28.35 KG/M2 | TEMPERATURE: 97.9 F | WEIGHT: 228 LBS | OXYGEN SATURATION: 97 % | SYSTOLIC BLOOD PRESSURE: 169 MMHG | DIASTOLIC BLOOD PRESSURE: 75 MMHG | HEIGHT: 75 IN

## 2022-03-15 DIAGNOSIS — E11.21 TYPE 2 DIABETES MELLITUS WITH DIABETIC NEPHROPATHY: ICD-10-CM

## 2022-03-15 DIAGNOSIS — I10 ESSENTIAL (PRIMARY) HYPERTENSION: ICD-10-CM

## 2022-03-15 DIAGNOSIS — E11.65 TYPE 2 DIABETES MELLITUS WITH DIABETIC NEPHROPATHY: ICD-10-CM

## 2022-03-15 PROCEDURE — 99215 OFFICE O/P EST HI 40 MIN: CPT

## 2022-03-15 NOTE — HISTORY OF PRESENT ILLNESS
[Diabetes Mellitus] : diabetes mellitus [Hypertension] : ~T hypertension [Doing Well] : doing well [Metformin] : Metformin HCl [Sulfonylurea] : Sulfonylurea [Does not check] : The patient is not checking blood pressure at home [Compliant] : the patient is compliant with ~his/her~ medication regimen [ACE Inhibitor] : an ACE inhibitor [None] : The patient is currently asymptomatic [Healthy Diet] : ~He/She~ consumes a diverse and healthy diet [Weight Concerns] : ~He/She~ has weight concens [Regular Exercise] : ~He/She~ exercises regularly [Current on all items] : the patient is current on all diabetes management items. [Hypoglycemia] : the patient reports no symptomatic hypoglycemic epsidodes. [Side Effects] : ~He/She~ denies medication side effects [Polyuria] : no polyuria [Polydypsia] : no polydipsia [Tobacco Use] : ~He/She~ does not use tobacco [Alcohol Use] : ~He/She~ denies alcohol use [Drug Abuse] : ~He/She~ denies drug use

## 2022-03-15 NOTE — DISCUSSION/SUMMARY
[FreeTextEntry1] : 76 year old man with hx DM-II, HTN, nephrolithiasis presents for follow-up, BP check.\par \par 1. HTN: repeated BP at end of visit and obtained 140/85; cont ACEi; pt reluctant to make any changes at this time - he says he can do better with his diet and exercise plan in the upcoming weeks\par \par 2. DM: last HbA1c 6.0 on 3/30/12; cont current med regimen; repeat HbA1c at next visit; consider d/c Sulfonylurea if remains stable at next visit; due for urine microalbumin studies, Pneumovax at next visit\par \par 3. Nephrolithiasis: stable; pt follows with  as well; following diet as per urology for uric acid stones

## 2022-03-15 NOTE — PHYSICAL EXAM
[General Appearance - Alert] : alert [General Appearance - In No Acute Distress] : in no acute distress [General Appearance - Well Nourished] : well nourished [General Appearance - Well Developed] : well developed [General Appearance - Well-Appearing] : healthy appearing [Completely Obscured] : completely [Cerumen in Canal] : by cerumen [Left Tympanic Membrane Was Examined] : was normal [Auscultation Breath Sounds / Voice Sounds] : lungs were clear to auscultation bilaterally [Heart Rate And Rhythm] : heart rate was normal and rhythm regular [Heart Sounds] : normal S1 and S2 [Heart Sounds Gallop] : no gallops [Murmurs] : no murmurs [Heart Sounds Pericardial Friction Rub] : no pericardial rub [Bowel Sounds] : normal bowel sounds [Abdomen Soft] : soft [Abdomen Tenderness] : non-tender [] : no hepato-splenomegaly [Abdomen Mass (___ Cm)] : no abdominal mass palpated [Involuntary Movements] : no involuntary movements were seen [Motor Tone] : muscle strength and tone were normal [Oriented To Time, Place, And Person] : oriented to person, place, and time [Impaired Insight] : insight and judgment were intact [Affect] : the affect was normal [Tympanic Membrane Erythematous Right] : was not red

## 2023-04-17 ENCOUNTER — RX RENEWAL (OUTPATIENT)
Age: 78
End: 2023-04-17

## 2023-05-18 ENCOUNTER — APPOINTMENT (OUTPATIENT)
Dept: INTERNAL MEDICINE | Facility: CLINIC | Age: 78
End: 2023-05-18
Payer: MEDICARE

## 2023-05-18 VITALS
HEART RATE: 80 BPM | BODY MASS INDEX: 28.85 KG/M2 | HEIGHT: 75 IN | WEIGHT: 232 LBS | DIASTOLIC BLOOD PRESSURE: 81 MMHG | SYSTOLIC BLOOD PRESSURE: 147 MMHG | TEMPERATURE: 97.1 F | OXYGEN SATURATION: 98 %

## 2023-05-18 DIAGNOSIS — Z00.00 ENCOUNTER FOR GENERAL ADULT MEDICAL EXAMINATION W/OUT ABNORMAL FINDINGS: ICD-10-CM

## 2023-05-18 PROCEDURE — G0439: CPT

## 2023-05-18 NOTE — ASSESSMENT
[FreeTextEntry1] : \par Preventive visit: pt is up to date with vaccines; he is up to date with colonoscopy screening; prostate cancer follow-up with urology; praised pt's tobacco-free lifestyle; encouraged use of smoke/CO detectors in the house and use of seatbelts when in vehicles; offered flu vaccine but patient declined monitor BMP  f/u urinelytes  Nephro: Dr Moore BP controlled  Pt on Amlodipine 10 mg at home

## 2023-05-18 NOTE — HEALTH RISK ASSESSMENT
[Good] : ~his/her~  mood as  good [No] : No [1 or 2 (0 pts)] : 1 or 2 (0 points) [Never (0 pts)] : Never (0 points) [No falls in past year] : Patient reported no falls in the past year [Audit-CScore] : 0 [Change in mental status noted] : No change in mental status noted [Language] : denies difficulty with language [Behavior] : denies difficulty with behavior [Learning/Retaining New Information] : denies difficulty learning/retaining new information [Handling Complex Tasks] : denies difficulty handling complex tasks [Reasoning] : denies difficulty with reasoning [Spatial Ability and Orientation] : denies difficulty with spatial ability and orientation [None] : None [With Significant Other] : lives with significant other [Retired] : retired [Single] : single [Sexually Active] : sexually active [High Risk Behavior] : no high risk behavior [Feels Safe at Home] : Feels safe at home [Fully functional (bathing, dressing, toileting, transferring, walking, feeding)] : Fully functional (bathing, dressing, toileting, transferring, walking, feeding) [Fully functional (using the telephone, shopping, preparing meals, housekeeping, doing laundry, using] : Fully functional and needs no help or supervision to perform IADLs (using the telephone, shopping, preparing meals, housekeeping, doing laundry, using transportation, managing medications and managing finances) [Reports changes in hearing] : Reports no changes in hearing [Reports changes in vision] : Reports no changes in vision [Reports normal functional visual acuity (ie: able to read med bottle)] : Reports normal functional visual acuity [Reports changes in dental health] : Reports no changes in dental health [Smoke Detector] : smoke detector [Carbon Monoxide Detector] : carbon monoxide detector [Guns at Home] : no guns at home [Safety elements used in home] : safety elements used in home [Seat Belt] :  uses seat belt [Sunscreen] : uses sunscreen [Travel to Developing Areas] : does not  travel to developing areas [TB Exposure] : is not being exposed to tuberculosis [Caregiver Concerns] : does not have caregiver concerns

## 2023-05-18 NOTE — PHYSICAL EXAM
[Well Nourished] : well nourished [Well Developed] : well developed [Well-Appearing] : well-appearing [Normal Sclera/Conjunctiva] : normal sclera/conjunctiva [PERRL] : pupils equal round and reactive to light [EOMI] : extraocular movements intact [No JVD] : no jugular venous distention [No Lymphadenopathy] : no lymphadenopathy [Supple] : supple [Thyroid Normal, No Nodules] : the thyroid was normal and there were no nodules present [No Respiratory Distress] : no respiratory distress  [No Accessory Muscle Use] : no accessory muscle use [Clear to Auscultation] : lungs were clear to auscultation bilaterally [Normal Rate] : normal rate  [Regular Rhythm] : with a regular rhythm [Normal S1, S2] : normal S1 and S2 [No Murmur] : no murmur heard [No Carotid Bruits] : no carotid bruits [No Abdominal Bruit] : a ~M bruit was not heard ~T in the abdomen [No Varicosities] : no varicosities [Pedal Pulses Present] : the pedal pulses are present [No Edema] : there was no peripheral edema [No Palpable Aorta] : no palpable aorta [No Extremity Clubbing/Cyanosis] : no extremity clubbing/cyanosis [Soft] : abdomen soft [Non Tender] : non-tender [Non-distended] : non-distended [No Masses] : no abdominal mass palpated [No HSM] : no HSM [Normal Bowel Sounds] : normal bowel sounds [Prostate Enlargement] : the prostate was not enlarged [No Prostate Nodules] : no prostate nodules [Prostate Tenderness] : the prostate was not tender [No CVA Tenderness] : no CVA  tenderness [No Spinal Tenderness] : no spinal tenderness [No Joint Swelling] : no joint swelling [Grossly Normal Strength/Tone] : grossly normal strength/tone [No Rash] : no rash [Coordination Grossly Intact] : coordination grossly intact [No Focal Deficits] : no focal deficits [Normal Gait] : normal gait [Deep Tendon Reflexes (DTR)] : deep tendon reflexes were 2+ and symmetric [Normal Affect] : the affect was normal [Normal Insight/Judgement] : insight and judgment were intact

## 2023-05-18 NOTE — HISTORY OF PRESENT ILLNESS
[FreeTextEntry1] : Presents for preventive visit  [de-identified] : \par Preventive visit: pt is up to date with vaccines; he is up to date with colonoscopy screening; he sees Urology for prostate cancer; he does not smoke cigarettes and does not misuse alcohol; he feels safe at home and has smoke/CO detectors in the house; he wears seatbelts when in vehicles

## 2023-08-09 ENCOUNTER — RX RENEWAL (OUTPATIENT)
Age: 78
End: 2023-08-09

## 2023-08-15 ENCOUNTER — INPATIENT (INPATIENT)
Facility: HOSPITAL | Age: 78
LOS: 1 days | Discharge: HOME | DRG: 313 | End: 2023-08-17
Attending: INTERNAL MEDICINE | Admitting: INTERNAL MEDICINE
Payer: MEDICARE

## 2023-08-15 VITALS
HEIGHT: 75 IN | WEIGHT: 227.96 LBS | HEART RATE: 122 BPM | SYSTOLIC BLOOD PRESSURE: 91 MMHG | TEMPERATURE: 98 F | DIASTOLIC BLOOD PRESSURE: 62 MMHG | OXYGEN SATURATION: 98 % | RESPIRATION RATE: 18 BRPM

## 2023-08-15 DIAGNOSIS — I45.5 OTHER SPECIFIED HEART BLOCK: ICD-10-CM

## 2023-08-15 DIAGNOSIS — Q62.10 CONGENITAL OCCLUSION OF URETER, UNSPECIFIED: Chronic | ICD-10-CM

## 2023-08-15 LAB
ALBUMIN SERPL ELPH-MCNC: 3.8 G/DL — SIGNIFICANT CHANGE UP (ref 3.3–5)
ALP SERPL-CCNC: 110 U/L — SIGNIFICANT CHANGE UP (ref 40–120)
ALT FLD-CCNC: 13 U/L — SIGNIFICANT CHANGE UP (ref 10–45)
ANION GAP SERPL CALC-SCNC: 14 MMOL/L — SIGNIFICANT CHANGE UP (ref 5–17)
APTT BLD: 31.1 SEC — SIGNIFICANT CHANGE UP (ref 24.5–35.6)
AST SERPL-CCNC: 16 U/L — SIGNIFICANT CHANGE UP (ref 10–40)
BASE EXCESS BLDV CALC-SCNC: 5.3 MMOL/L — HIGH (ref -2–3)
BASOPHILS # BLD AUTO: 0.04 K/UL — SIGNIFICANT CHANGE UP (ref 0–0.2)
BASOPHILS NFR BLD AUTO: 0.6 % — SIGNIFICANT CHANGE UP (ref 0–2)
BILIRUB SERPL-MCNC: 1.2 MG/DL — SIGNIFICANT CHANGE UP (ref 0.2–1.2)
BUN SERPL-MCNC: 25 MG/DL — HIGH (ref 7–23)
CA-I SERPL-SCNC: 1.24 MMOL/L — SIGNIFICANT CHANGE UP (ref 1.15–1.33)
CALCIUM SERPL-MCNC: 9.9 MG/DL — SIGNIFICANT CHANGE UP (ref 8.4–10.5)
CHLORIDE BLDV-SCNC: 101 MMOL/L — SIGNIFICANT CHANGE UP (ref 96–108)
CHLORIDE SERPL-SCNC: 101 MMOL/L — SIGNIFICANT CHANGE UP (ref 96–108)
CO2 BLDV-SCNC: 31 MMOL/L — HIGH (ref 22–26)
CO2 SERPL-SCNC: 25 MMOL/L — SIGNIFICANT CHANGE UP (ref 22–31)
CREAT SERPL-MCNC: 1.39 MG/DL — HIGH (ref 0.5–1.3)
EGFR: 53 ML/MIN/1.73M2 — LOW
EOSINOPHIL # BLD AUTO: 0.02 K/UL — SIGNIFICANT CHANGE UP (ref 0–0.5)
EOSINOPHIL NFR BLD AUTO: 0.3 % — SIGNIFICANT CHANGE UP (ref 0–6)
GAS PNL BLDV: 140 MMOL/L — SIGNIFICANT CHANGE UP (ref 136–145)
GAS PNL BLDV: SIGNIFICANT CHANGE UP
GAS PNL BLDV: SIGNIFICANT CHANGE UP
GLUCOSE BLDC GLUCOMTR-MCNC: 103 MG/DL — HIGH (ref 70–99)
GLUCOSE BLDV-MCNC: 189 MG/DL — HIGH (ref 70–99)
GLUCOSE SERPL-MCNC: 194 MG/DL — HIGH (ref 70–99)
HCO3 BLDV-SCNC: 30 MMOL/L — HIGH (ref 22–29)
HCT VFR BLD CALC: 35.9 % — LOW (ref 39–50)
HCT VFR BLDA CALC: 35 % — LOW (ref 39–51)
HGB BLD CALC-MCNC: 11.7 G/DL — LOW (ref 12.6–17.4)
HGB BLD-MCNC: 12.9 G/DL — LOW (ref 13–17)
IMM GRANULOCYTES NFR BLD AUTO: 0.4 % — SIGNIFICANT CHANGE UP (ref 0–0.9)
INR BLD: 1.15 RATIO — SIGNIFICANT CHANGE UP (ref 0.85–1.18)
LACTATE BLDV-MCNC: 2.5 MMOL/L — HIGH (ref 0.5–2)
LACTATE SERPL-SCNC: 1 MMOL/L — SIGNIFICANT CHANGE UP (ref 0.5–2)
LYMPHOCYTES # BLD AUTO: 0.83 K/UL — LOW (ref 1–3.3)
LYMPHOCYTES # BLD AUTO: 12 % — LOW (ref 13–44)
MCHC RBC-ENTMCNC: 31.5 PG — SIGNIFICANT CHANGE UP (ref 27–34)
MCHC RBC-ENTMCNC: 35.9 GM/DL — SIGNIFICANT CHANGE UP (ref 32–36)
MCV RBC AUTO: 87.6 FL — SIGNIFICANT CHANGE UP (ref 80–100)
MONOCYTES # BLD AUTO: 0.46 K/UL — SIGNIFICANT CHANGE UP (ref 0–0.9)
MONOCYTES NFR BLD AUTO: 6.6 % — SIGNIFICANT CHANGE UP (ref 2–14)
NEUTROPHILS # BLD AUTO: 5.55 K/UL — SIGNIFICANT CHANGE UP (ref 1.8–7.4)
NEUTROPHILS NFR BLD AUTO: 80.1 % — HIGH (ref 43–77)
NRBC # BLD: 0 /100 WBCS — SIGNIFICANT CHANGE UP (ref 0–0)
NT-PROBNP SERPL-SCNC: 1050 PG/ML — HIGH (ref 0–300)
PCO2 BLDV: 43 MMHG — SIGNIFICANT CHANGE UP (ref 42–55)
PH BLDV: 7.45 — HIGH (ref 7.32–7.43)
PLATELET # BLD AUTO: 163 K/UL — SIGNIFICANT CHANGE UP (ref 150–400)
PO2 BLDV: 17 MMHG — LOW (ref 25–45)
POTASSIUM BLDV-SCNC: 3.4 MMOL/L — LOW (ref 3.5–5.1)
POTASSIUM SERPL-MCNC: 3.3 MMOL/L — LOW (ref 3.5–5.3)
POTASSIUM SERPL-SCNC: 3.3 MMOL/L — LOW (ref 3.5–5.3)
PROT SERPL-MCNC: 7.1 G/DL — SIGNIFICANT CHANGE UP (ref 6–8.3)
PROTHROM AB SERPL-ACNC: 12.6 SEC — SIGNIFICANT CHANGE UP (ref 9.5–13)
RBC # BLD: 4.1 M/UL — LOW (ref 4.2–5.8)
RBC # FLD: 13.4 % — SIGNIFICANT CHANGE UP (ref 10.3–14.5)
SAO2 % BLDV: 38.1 % — LOW (ref 67–88)
SODIUM SERPL-SCNC: 140 MMOL/L — SIGNIFICANT CHANGE UP (ref 135–145)
TROPONIN T, HIGH SENSITIVITY RESULT: 48 NG/L — SIGNIFICANT CHANGE UP (ref 0–51)
TROPONIN T, HIGH SENSITIVITY RESULT: 50 NG/L — SIGNIFICANT CHANGE UP (ref 0–51)
WBC # BLD: 6.93 K/UL — SIGNIFICANT CHANGE UP (ref 3.8–10.5)
WBC # FLD AUTO: 6.93 K/UL — SIGNIFICANT CHANGE UP (ref 3.8–10.5)

## 2023-08-15 PROCEDURE — 99285 EMERGENCY DEPT VISIT HI MDM: CPT | Mod: FS

## 2023-08-15 PROCEDURE — 71046 X-RAY EXAM CHEST 2 VIEWS: CPT | Mod: 26

## 2023-08-15 RX ORDER — REPAGLINIDE 1 MG/1
1 TABLET ORAL
Refills: 0 | DISCHARGE

## 2023-08-15 RX ORDER — LOSARTAN POTASSIUM 100 MG/1
100 TABLET, FILM COATED ORAL DAILY
Refills: 0 | Status: DISCONTINUED | OUTPATIENT
Start: 2023-08-15 | End: 2023-08-17

## 2023-08-15 RX ORDER — SODIUM CHLORIDE 9 MG/ML
1000 INJECTION, SOLUTION INTRAVENOUS
Refills: 0 | Status: DISCONTINUED | OUTPATIENT
Start: 2023-08-15 | End: 2023-08-17

## 2023-08-15 RX ORDER — INSULIN LISPRO 100/ML
VIAL (ML) SUBCUTANEOUS
Refills: 0 | Status: DISCONTINUED | OUTPATIENT
Start: 2023-08-15 | End: 2023-08-17

## 2023-08-15 RX ORDER — POTASSIUM CHLORIDE 20 MEQ
40 PACKET (EA) ORAL ONCE
Refills: 0 | Status: COMPLETED | OUTPATIENT
Start: 2023-08-15 | End: 2023-08-15

## 2023-08-15 RX ORDER — DEXTROSE 50 % IN WATER 50 %
25 SYRINGE (ML) INTRAVENOUS ONCE
Refills: 0 | Status: DISCONTINUED | OUTPATIENT
Start: 2023-08-15 | End: 2023-08-17

## 2023-08-15 RX ORDER — SODIUM CHLORIDE 9 MG/ML
500 INJECTION INTRAMUSCULAR; INTRAVENOUS; SUBCUTANEOUS ONCE
Refills: 0 | Status: COMPLETED | OUTPATIENT
Start: 2023-08-15 | End: 2023-08-15

## 2023-08-15 RX ORDER — DEXTROSE 50 % IN WATER 50 %
12.5 SYRINGE (ML) INTRAVENOUS ONCE
Refills: 0 | Status: DISCONTINUED | OUTPATIENT
Start: 2023-08-15 | End: 2023-08-17

## 2023-08-15 RX ORDER — CHOLECALCIFEROL (VITAMIN D3) 125 MCG
1 CAPSULE ORAL
Refills: 0 | DISCHARGE

## 2023-08-15 RX ORDER — GLUCAGON INJECTION, SOLUTION 0.5 MG/.1ML
1 INJECTION, SOLUTION SUBCUTANEOUS ONCE
Refills: 0 | Status: DISCONTINUED | OUTPATIENT
Start: 2023-08-15 | End: 2023-08-17

## 2023-08-15 RX ORDER — DEXTROSE 50 % IN WATER 50 %
15 SYRINGE (ML) INTRAVENOUS ONCE
Refills: 0 | Status: DISCONTINUED | OUTPATIENT
Start: 2023-08-15 | End: 2023-08-17

## 2023-08-15 RX ORDER — ASPIRIN/CALCIUM CARB/MAGNESIUM 324 MG
1 TABLET ORAL
Refills: 0 | DISCHARGE

## 2023-08-15 RX ORDER — ATORVASTATIN CALCIUM 80 MG/1
40 TABLET, FILM COATED ORAL AT BEDTIME
Refills: 0 | Status: DISCONTINUED | OUTPATIENT
Start: 2023-08-15 | End: 2023-08-17

## 2023-08-15 RX ORDER — ASPIRIN/CALCIUM CARB/MAGNESIUM 324 MG
81 TABLET ORAL DAILY
Refills: 0 | Status: DISCONTINUED | OUTPATIENT
Start: 2023-08-15 | End: 2023-08-17

## 2023-08-15 RX ORDER — INDAPAMIDE 1.25 MG
1.25 TABLET ORAL
Qty: 0 | Refills: 0 | DISCHARGE

## 2023-08-15 RX ORDER — INDAPAMIDE 1.25 MG
1 TABLET ORAL
Refills: 0 | DISCHARGE

## 2023-08-15 RX ORDER — ATORVASTATIN CALCIUM 80 MG/1
1 TABLET, FILM COATED ORAL
Refills: 0 | DISCHARGE

## 2023-08-15 RX ORDER — INSULIN GLARGINE 100 [IU]/ML
44 INJECTION, SOLUTION SUBCUTANEOUS
Refills: 0 | DISCHARGE

## 2023-08-15 RX ORDER — ENOXAPARIN SODIUM 100 MG/ML
40 INJECTION SUBCUTANEOUS EVERY 24 HOURS
Refills: 0 | Status: DISCONTINUED | OUTPATIENT
Start: 2023-08-15 | End: 2023-08-17

## 2023-08-15 RX ORDER — LOSARTAN POTASSIUM 100 MG/1
1 TABLET, FILM COATED ORAL
Refills: 0 | DISCHARGE

## 2023-08-15 RX ADMIN — Medication 40 MILLIEQUIVALENT(S): at 15:45

## 2023-08-15 RX ADMIN — ATORVASTATIN CALCIUM 40 MILLIGRAM(S): 80 TABLET, FILM COATED ORAL at 23:07

## 2023-08-15 RX ADMIN — SODIUM CHLORIDE 500 MILLILITER(S): 9 INJECTION INTRAMUSCULAR; INTRAVENOUS; SUBCUTANEOUS at 15:08

## 2023-08-15 NOTE — ED PROVIDER NOTE - PROGRESS NOTE DETAILS
[facility]AR room called, pt had a 5 second pause on monitor, pt was asymptomatic.  Spoke with Dr Kang regarding case and the pause, would like Dr Julian for admission.

## 2023-08-15 NOTE — ED PROVIDER NOTE - PHYSICAL EXAMINATION
PE:  Gen: NAD  Head: NCAT  ENT: MMM, conjunctiva normal   Chest: RRR, normal perfusion  Lungs: Symmetrical chest rise, lungs CTAB  Abdomen: soft, NTND, No rebound/guarding  Ext: No gross deformities  Neuro: awake and alert, ambulatory with steady gait  Skin: no rashes

## 2023-08-15 NOTE — H&P ADULT - NSICDXPASTSURGICALHX_GEN_ALL_CORE_FT
PAST SURGICAL HISTORY:  Nephrolithiasis stent placement      Ureteral stenosis with stent placement

## 2023-08-15 NOTE — ED PROVIDER NOTE - CLINICAL SUMMARY MEDICAL DECISION MAKING FREE TEXT BOX
77-year-old male with after mentioned history here for chest pain.  Per patient patient has had a CT scan that showed an abnormal plaque however he follows with cardiology regularly and it was not thought to be an issue.  Differential diagnosis includes not limited to ACS, URI, iatrogenic due to medication side effect, GERD.  Patient is asymptomatic at present.  EKG normal sinus rhythm at a rate of 86 bpm.  Normal MA, QRS, QTc intervals.  Patient initially had a heart rate of 122 and blood pressure of 91/62 and was flagged as a code sepsis however patient does not have any infectious symptoms and repeat blood pressure was 136/82 with a heart rate of 86 and thus does not meet sepsis criteria.

## 2023-08-15 NOTE — H&P ADULT - NSHPPHYSICALEXAM_GEN_ALL_CORE
Vital Signs Last 24 Hrs  T(C): 36.7 (15 Aug 2023 17:33), Max: 36.7 (15 Aug 2023 12:39)  T(F): 98.1 (15 Aug 2023 17:33), Max: 98.1 (15 Aug 2023 17:33)  HR: 70 (15 Aug 2023 17:33) (70 - 122)  BP: 135/88 (15 Aug 2023 17:33) (91/62 - 136/82)  BP(mean): --  RR: 18 (15 Aug 2023 17:33) (18 - 20)  SpO2: 100% (15 Aug 2023 17:33) (98% - 100%)    Parameters below as of 15 Aug 2023 17:33  Patient On (Oxygen Delivery Method): room air    PHYSICAL EXAM:  GENERAL: NAD, well-developed  HEAD:  Atraumatic, Normocephalic  EYES: EOMI, PERRLA, conjunctiva and sclera clear  NECK: Supple, No JVD  CHEST/LUNG: Clear to auscultation bilaterally; No wheeze  HEART: Regular rate and rhythm; No murmurs, rubs, or gallops  ABDOMEN: Soft, Nontender, Nondistended; Bowel sounds present  EXTREMITIES:  2+ Peripheral Pulses, No clubbing, cyanosis, or edema  PSYCH: AAOx3  NEUROLOGY: non-focal  SKIN: No rashes or lesions

## 2023-08-15 NOTE — H&P ADULT - HISTORY OF PRESENT ILLNESS
77-year-old male with a history of hypertension, hyperlipidemia, insulin-dependent diabetes, prostate cancer currently on daily radiation here for chest discomfort.  Patient had chest discomfort this morning, nonradiating, nonpleuritic and nonexertional without diaphoresis, nausea or vomiting.  No fevers, shortness of breath, cough, congestion.  Patient has what he describes as "lethargy" since starting radiation which is to be expected for the patient given the medications he is on that is unchanged however patient does have peak decreased p.o. intake over the last 24 to 48 hours which patient reports is due to not wanting to urinate.  noted to have pause on tele in ER. pt denied any symptoms

## 2023-08-15 NOTE — H&P ADULT - NSICDXPASTMEDICALHX_GEN_ALL_CORE_FT
PAST MEDICAL HISTORY:  History of Melanoma     History of Renal Calculi     Hypertension     IDDM (insulin dependent diabetes mellitus)     Kidney stone     NIDDM (Non-Insulin Dependent Diabetes Mellitus) now uses insulin

## 2023-08-15 NOTE — ED ADULT NURSE NOTE - OBJECTIVE STATEMENT
Male 77 years old with past medical history of HTN, DM and prostate cancer on radiation 5 days a week, came in for chest discomfort onset this morning, non radiating. Denies shortness of breathe, N/V, sweating cough, fever or chills. Safety and comfort maintained. Call bell within easy reach. Will continue to monitor.

## 2023-08-16 ENCOUNTER — TRANSCRIPTION ENCOUNTER (OUTPATIENT)
Age: 78
End: 2023-08-16

## 2023-08-16 DIAGNOSIS — E11.9 TYPE 2 DIABETES MELLITUS WITHOUT COMPLICATIONS: ICD-10-CM

## 2023-08-16 DIAGNOSIS — R00.1 BRADYCARDIA, UNSPECIFIED: ICD-10-CM

## 2023-08-16 DIAGNOSIS — C61 MALIGNANT NEOPLASM OF PROSTATE: ICD-10-CM

## 2023-08-16 LAB
A1C WITH ESTIMATED AVERAGE GLUCOSE RESULT: 7.9 % — HIGH (ref 4–5.6)
ALBUMIN SERPL ELPH-MCNC: 3.7 G/DL — SIGNIFICANT CHANGE UP (ref 3.3–5)
ALP SERPL-CCNC: 115 U/L — SIGNIFICANT CHANGE UP (ref 40–120)
ALT FLD-CCNC: 13 U/L — SIGNIFICANT CHANGE UP (ref 10–45)
ANION GAP SERPL CALC-SCNC: 17 MMOL/L — SIGNIFICANT CHANGE UP (ref 5–17)
AST SERPL-CCNC: 17 U/L — SIGNIFICANT CHANGE UP (ref 10–40)
BILIRUB SERPL-MCNC: 1.3 MG/DL — HIGH (ref 0.2–1.2)
BUN SERPL-MCNC: 23 MG/DL — SIGNIFICANT CHANGE UP (ref 7–23)
CALCIUM SERPL-MCNC: 9.8 MG/DL — SIGNIFICANT CHANGE UP (ref 8.4–10.5)
CHLORIDE SERPL-SCNC: 100 MMOL/L — SIGNIFICANT CHANGE UP (ref 96–108)
CO2 SERPL-SCNC: 25 MMOL/L — SIGNIFICANT CHANGE UP (ref 22–31)
CREAT SERPL-MCNC: 1.28 MG/DL — SIGNIFICANT CHANGE UP (ref 0.5–1.3)
EGFR: 58 ML/MIN/1.73M2 — LOW
ESTIMATED AVERAGE GLUCOSE: 180 MG/DL — HIGH (ref 68–114)
GLUCOSE BLDC GLUCOMTR-MCNC: 159 MG/DL — HIGH (ref 70–99)
GLUCOSE BLDC GLUCOMTR-MCNC: 283 MG/DL — HIGH (ref 70–99)
GLUCOSE BLDC GLUCOMTR-MCNC: 312 MG/DL — HIGH (ref 70–99)
GLUCOSE SERPL-MCNC: 171 MG/DL — HIGH (ref 70–99)
HCT VFR BLD CALC: 36.6 % — LOW (ref 39–50)
HCV AB S/CO SERPL IA: 0.1 S/CO — SIGNIFICANT CHANGE UP (ref 0–0.99)
HCV AB SERPL-IMP: SIGNIFICANT CHANGE UP
HGB BLD-MCNC: 12.7 G/DL — LOW (ref 13–17)
MCHC RBC-ENTMCNC: 31.3 PG — SIGNIFICANT CHANGE UP (ref 27–34)
MCHC RBC-ENTMCNC: 34.7 GM/DL — SIGNIFICANT CHANGE UP (ref 32–36)
MCV RBC AUTO: 90.1 FL — SIGNIFICANT CHANGE UP (ref 80–100)
NRBC # BLD: 0 /100 WBCS — SIGNIFICANT CHANGE UP (ref 0–0)
PLATELET # BLD AUTO: 151 K/UL — SIGNIFICANT CHANGE UP (ref 150–400)
POTASSIUM SERPL-MCNC: 3.9 MMOL/L — SIGNIFICANT CHANGE UP (ref 3.5–5.3)
POTASSIUM SERPL-SCNC: 3.9 MMOL/L — SIGNIFICANT CHANGE UP (ref 3.5–5.3)
PROT SERPL-MCNC: 7.2 G/DL — SIGNIFICANT CHANGE UP (ref 6–8.3)
RBC # BLD: 4.06 M/UL — LOW (ref 4.2–5.8)
RBC # FLD: 13.5 % — SIGNIFICANT CHANGE UP (ref 10.3–14.5)
SODIUM SERPL-SCNC: 142 MMOL/L — SIGNIFICANT CHANGE UP (ref 135–145)
TSH SERPL-MCNC: 1.7 UIU/ML — SIGNIFICANT CHANGE UP (ref 0.27–4.2)
WBC # BLD: 6.24 K/UL — SIGNIFICANT CHANGE UP (ref 3.8–10.5)
WBC # FLD AUTO: 6.24 K/UL — SIGNIFICANT CHANGE UP (ref 3.8–10.5)

## 2023-08-16 PROCEDURE — 93306 TTE W/DOPPLER COMPLETE: CPT | Mod: 26

## 2023-08-16 RX ADMIN — Medication 81 MILLIGRAM(S): at 11:49

## 2023-08-16 RX ADMIN — ATORVASTATIN CALCIUM 40 MILLIGRAM(S): 80 TABLET, FILM COATED ORAL at 22:06

## 2023-08-16 RX ADMIN — Medication 3: at 18:25

## 2023-08-16 RX ADMIN — LOSARTAN POTASSIUM 100 MILLIGRAM(S): 100 TABLET, FILM COATED ORAL at 06:01

## 2023-08-16 RX ADMIN — Medication 4: at 11:48

## 2023-08-16 RX ADMIN — Medication 1: at 07:53

## 2023-08-16 NOTE — DISCHARGE NOTE PROVIDER - NSDCMRMEDTOKEN_GEN_ALL_CORE_FT
aspirin 81 mg oral delayed release tablet: 1 tab(s) orally once a day  atorvastatin 40 mg oral tablet: 1 tab(s) orally once a day  cholecalciferol 25 mcg (1000 intl units) oral capsule: 1 cap(s) orally once a day  cloNIDine 0.1 mg oral tablet: 1 tab(s) orally 2 times a day  indapamide 1.25 mg oral tablet: 1 tab(s) orally once a day  losartan 100 mg oral tablet: 1 tab(s) orally once a day  metoprolol tartrate 50 mg oral tablet: 1 tab(s) orally 2 times a day  repaglinide 2 mg oral tablet: 1 tab(s) orally 3 times a day  Toujeo SoloStar 300 units/mL subcutaneous solution: 44 unit(s) subcutaneous once a day   aspirin 81 mg oral delayed release tablet: 1 tab(s) orally once a day  atorvastatin 40 mg oral tablet: 1 tab(s) orally once a day  cholecalciferol 25 mcg (1000 intl units) oral capsule: 1 cap(s) orally once a day  indapamide 1.25 mg oral tablet: 1 tab(s) orally once a day  losartan 100 mg oral tablet: 1 tab(s) orally once a day  metoprolol tartrate 25 mg oral tablet: 1 tab(s) orally every 12 hours  repaglinide 2 mg oral tablet: 1 tab(s) orally 3 times a day  Toujeo SoloStar 300 units/mL subcutaneous solution: 44 unit(s) subcutaneous once a day

## 2023-08-16 NOTE — CONSULT NOTE ADULT - SUBJECTIVE AND OBJECTIVE BOX
CHIEF COMPLAINT:    HISTORY OF PRESENT ILLNESS:  77-year-old male well known to me from office with a history of hypertension, hyperlipidemia, insulin-dependent diabetes, prostate cancer currently on daily radiation here for chest discomfort.  Patient had chest discomfort this morning, nonradiating, nonpleuritic and nonexertional without diaphoresis, nausea or vomiting.  No fevers, shortness of breath, cough, congestion.  Patient has what he describes as "lethargy" since starting radiation which is to be expected for the patient given the medications he is on that is unchanged however patient does have peak decreased p.o. intake over the last 24 to 48 hours which patient reports is due to not wanting to urinate.  noted to have 5 second pause on tele in ER. pt denied any symptoms     He was completely asymptomatic   no dizziness or chest pain   PAST MEDICAL & SURGICAL HISTORY:  NIDDM (Non-Insulin Dependent Diabetes Mellitus)  now uses insulin      History of Melanoma      History of Renal Calculi      Hypertension      Kidney stone      IDDM (insulin dependent diabetes mellitus)      Nephrolithiasis  stent placement        Ureteral stenosis  with stent placement              MEDICATIONS:  aspirin enteric coated 81 milliGRAM(s) Oral daily  enoxaparin Injectable 40 milliGRAM(s) SubCutaneous every 24 hours  losartan 100 milliGRAM(s) Oral daily            atorvastatin 40 milliGRAM(s) Oral at bedtime  dextrose 50% Injectable 25 Gram(s) IV Push once  dextrose 50% Injectable 12.5 Gram(s) IV Push once  dextrose 50% Injectable 25 Gram(s) IV Push once  dextrose Oral Gel 15 Gram(s) Oral once PRN  glucagon  Injectable 1 milliGRAM(s) IntraMuscular once  insulin lispro (ADMELOG) corrective regimen sliding scale   SubCutaneous three times a day before meals    dextrose 5%. 1000 milliLiter(s) IV Continuous <Continuous>  dextrose 5%. 1000 milliLiter(s) IV Continuous <Continuous>      FAMILY HISTORY:      SOCIAL HISTORY:    [ ] Non-smoker  [ ] Smoker  [ ] Alcohol    Allergies    glipiZIDE (Unknown)    Intolerances    	    REVIEW OF SYSTEMS:  CONSTITUTIONAL: No fever, weight loss, or fatigue  EYES: No eye pain, visual disturbances, or discharge  ENMT:  No difficulty hearing, tinnitus, vertigo; No sinus or throat pain  NECK: No pain or stiffness  RESPIRATORY: No cough, wheezing, chills or hemoptysis; No Shortness of Breath  CARDIOVASCULAR: No chest pain, palpitations, passing out, dizziness, or leg swelling  GASTROINTESTINAL: No abdominal or epigastric pain. No nausea, vomiting, or hematemesis; No diarrhea or constipation. No melena or hematochezia.  GENITOURINARY: No dysuria, frequency, hematuria, or incontinence  NEUROLOGICAL: No headaches, memory loss, loss of strength, numbness, or tremors  SKIN: No itching, burning, rashes, or lesions   LYMPH Nodes: No enlarged glands  ENDOCRINE: No heat or cold intolerance; No hair loss  MUSCULOSKELETAL: No joint pain or swelling; No muscle, back, or extremity pain  PSYCHIATRIC: No depression, anxiety, mood swings, or difficulty sleeping  HEME/LYMPH: No easy bruising, or bleeding gums  ALLERY AND IMMUNOLOGIC: No hives or eczema	    [ ] All others negative	  [ ] Unable to obtain    PHYSICAL EXAM:  T(C): 36.7 (08-16-23 @ 05:10), Max: 36.7 (08-15-23 @ 12:39)  HR: 77 (08-16-23 @ 05:10) (70 - 122)  BP: 136/88 (08-16-23 @ 05:10) (91/62 - 153/93)  RR: 18 (08-16-23 @ 05:10) (18 - 20)  SpO2: 98% (08-16-23 @ 05:10) (96% - 100%)  Wt(kg): --  I&O's Summary      Appearance: Normal	  HEENT:   Normal oral mucosa, PERRL, EOMI	  Lymphatic: No lymphadenopathy  Cardiovascular: Normal S1 S2, No JVD, No murmurs, No edema  Respiratory: Lungs clear to auscultation	  Psychiatry: A & O x 3, Mood & affect appropriate  Gastrointestinal:  Soft, Non-tender, + BS	  Skin: No rashes, No ecchymoses, No cyanosis	  Neurologic: Non-focal  Extremities: Normal range of motion, No clubbing, cyanosis or edema  Vascular: Peripheral pulses palpable 2+ bilaterally    TELEMETRY: 	SR 5 second pause     ECG:  	  RADIOLOGY:   < from: Xray Chest 2 Views PA/Lat (08.15.23 @ 14:02) >    ACC: [id number] EXAM:  XR CHEST PA LAT 2V   ORDERED BY:  ENEIDA ANDREW     PROCEDURE DATE:  08/15/2023          INTERPRETATION:  EXAMINATION: XR CHEST PA AND LATERAL    CLINICAL INDICATION: chest pain    TECHNIQUE: PA and lateral views of the chest wereobtained.    COMPARISON: Chest radiograph 12/15/2017.    FINDINGS:  The heart is normal in size.  The lungs are clear. An azygos fissure is noted.  No pleural effusion or pneumothorax.    Right axillary clips noted    IMPRESSION:  Clear lungs.    ---End of Report ---        < end of copied text >    OTHER: 	  	  LABS:	 	    CARDIAC MARKERS:                                  12.7   6.24  )-----------( 151      ( 16 Aug 2023 07:03 )             36.6     08-16    142  |  100  |  23  ----------------------------<  171<H>  3.9   |  25  |  1.28    Ca    9.8      16 Aug 2023 07:03    TPro  7.2  /  Alb  3.7  /  TBili  1.3<H>  /  DBili  x   /  AST  17  /  ALT  13  /  AlkPhos  115  08-16    proBNP:   Lipid Profile:   HgA1c:   TSH: Thyroid Stimulating Hormone, Serum: 1.70 uIU/mL (08-15 @ 15:23)

## 2023-08-16 NOTE — DISCHARGE NOTE PROVIDER - CARE PROVIDER_API CALL
Herbert Kang  Cardiology  [address] [place] [address], [facility] [address]  [place] [address]  Phone: [phone]  Fax: [phone]  Follow Up Time:

## 2023-08-16 NOTE — CONSULT NOTE ADULT - ASSESSMENT
77-year-old male well known to me from office with a history of hypertension, hyperlipidemia, insulin-dependent diabetes, prostate cancer currently on daily radiation here for chest discomfort.  Patient had chest discomfort this morning, nonradiating, nonpleuritic and nonexertional without diaphoresis, nausea or vomiting.  No fevers, shortness of breath, cough, congestion.  Patient has what he describes as "lethargy" since starting radiation which is to be expected for the patient given the medications he is on that is unchanged however patient does have peak decreased p.o. intake over the last 24 to 48 hours which patient reports is due to not wanting to urinate.  noted to have 5 second pause on tele in ER. pt denied any symptoms     He was completely asymptomatic   no dizziness or chest pain

## 2023-08-16 NOTE — DISCHARGE NOTE PROVIDER - HOSPITAL COURSE
77-year-old male with a history of hypertension, hyperlipidemia, insulin-dependent diabetes, prostate cancer currently on daily radiation p/w chest discomfort.  Patient had chest discomfort morning, non-radiating, nonpleuritic and nonexertional without diaphoresis, nausea or vomiting.  No fevers, shortness of breath, cough, congestion.  Patient has what he describes as "lethargy" since starting radiation which is to be expected for the patient given the medications he is on that is unchanged however patient does have peak decreased p.o. intake over the last 24 to 48 hours which patient reports is due to not wanting to urinate.  In the ED, patient noted to have pause on tele, he was asymptomatic.      ·  Problem: Chest pain  Troponin negative x2  TTE with normal biventricular size and systolic function       ·  Problem: Sinus pause  5 second pause on telemetry  Hold AVN blockers  TSH normal  Recent outpatient SPECT negative  Monitor on telemetry      ·  Problem: Prostate cancer.   On treatment   Outpatient oncology follow up.      ·  Problem: Diabetes.   ISS  Monitor fingersticks   77-year-old male with a history of hypertension, hyperlipidemia, insulin-dependent diabetes, prostate cancer currently on daily radiation p/w chest discomfort.  Patient had chest discomfort morning, non-radiating, nonpleuritic and nonexertional without diaphoresis, nausea or vomiting.  No fevers, shortness of breath, cough, congestion.  Patient has what he describes as "lethargy" since starting radiation which is to be expected for the patient given the medications he is on that is unchanged however patient does have peak decreased p.o. intake over the last 24 to 48 hours which patient reports is due to not wanting to urinate.  In the ED, patient noted to have pause on tele, he was asymptomatic.       Chest pain  Troponin negative x2  TTE with normal biventricular size and systolic function        Sinus pause  5 second pause on telemetry , no pauses on Tele overnight   TSH normal  Recent outpatient SPECT negative   Pt evaluated by DR Kang low dose Beta-blockers  and d/c to home  with OP MCT monitor , he will arrange it form his office        Prostate cancer.   On treatment   Outpatient oncology follow up.      Diabetes.   ISS  Monitor fingersticks     Pt remained HD stable  asymptomatic , will discharge home with close follow up with DR Kang cardiologist for  MCT device eval next week

## 2023-08-16 NOTE — CONSULT NOTE ADULT - PROBLEM SELECTOR RECOMMENDATION 9
5 second pause on Tele   check thyroid panel   TTE with normal biventricular size and systolic function   recent negative SPECT in my office   Monitor for another 24 hours, if no events DC home

## 2023-08-16 NOTE — DISCHARGE NOTE PROVIDER - NSDCCPCAREPLAN_GEN_ALL_CORE_FT
PRINCIPAL DISCHARGE DIAGNOSIS  Diagnosis: Chest pain  Assessment and Plan of Treatment:      PRINCIPAL DISCHARGE DIAGNOSIS  Diagnosis: Atypical chest pain  Assessment and Plan of Treatment:   HOME CARE INSTRUCTIONS  For the next few days, avoid physical activities that bring on chest pain. Continue physical activities as directed.  Do not smoke.  Avoid drinking alcohol.   Only take over-the-counter or prescription medicine for pain, discomfort, or fever as directed by your caregiver.  Follow your caregiver's suggestions for further testing if your chest pain does not go away.  Keep any follow-up appointments you made. If you do not go to an appointment, you could develop lasting (chronic) problems with pain. If there is any problem keeping an appointment, you must call to reschedule.   SEEK MEDICAL CARE IF:  You think you are having problems from the medicine you are taking. Read your medicine instructions carefully.  Your chest pain does not go away, even after treatment.  You develop a rash with blisters on your chest.  SEEK IMMEDIATE MEDICAL CARE IF:  You have increased chest pain or pain that spreads to your arm, neck, jaw, back, or abdomen.   You develop shortness of breath, an increasing cough, or you are coughing up blood.  You have severe back or abdominal pain, feel nauseous, or vomit.  You develop severe weakness, fainting, or chills.  You have a fever.  THIS IS AN EMERGENCY. Do not wait to see if the pain will go away. Get medical help at once. Call your local emergency services (_____________________). Do not drive yourself to the hospital.        SECONDARY DISCHARGE DIAGNOSES  Diagnosis: Diabetes  Assessment and Plan of Treatment: HgA1C this admission.  Make sure you get your HgA1c checked every three months.  If you take oral diabetes medications, check your blood glucose two times a day.  If you take insulin, check your blood glucose before meals and at bedtime.  It's important not to skip any meals.  Keep a log of your blood glucose results and always take it with you to your doctor appointments.  Keep a list of your current medications including injectables and over the counter medications and bring this medication list with you to all your doctor appointments.  If you have not seen your opthalmologist this year call for appointment.  Check your feet daily for redness, sores, or openings. Do not self treat. If no improvement in two days call your primary care physician for an appointment.  Low blood sugar (hypoglycemia) is a blood sugar below 70mg/dl. Check your blood sugar if you feel signs/symptoms of hypoglycemia. If your blood sugar is below 70 take 15 grams of carbohydrates (ex 4 oz of apple juice, 3-4 glucosr tablets, or 4-6 oz of regular soda) wait 15 minutes and repeat blood sugar to make sure it comes up above 70.  If your blood sugar is above 70 and you are due for a meal, have a meal.  If you are not due for a meal have a snack.  This snack helps keeps your blood sugar at a safe range.      Diagnosis: Prostate cancer  Assessment and Plan of Treatment: c/w radiation treatmenta s recommended    Diagnosis: Bradycardia  Assessment and Plan of Treatment: resolved   c/w Low dose  Lopressor 25 mg BID    please follow up with DR Kang for OP MCT device

## 2023-08-17 ENCOUNTER — TRANSCRIPTION ENCOUNTER (OUTPATIENT)
Age: 78
End: 2023-08-17

## 2023-08-17 VITALS — HEART RATE: 79 BPM | DIASTOLIC BLOOD PRESSURE: 78 MMHG | SYSTOLIC BLOOD PRESSURE: 147 MMHG

## 2023-08-17 LAB
ANION GAP SERPL CALC-SCNC: 11 MMOL/L — SIGNIFICANT CHANGE UP (ref 5–17)
ANION GAP SERPL CALC-SCNC: 14 MMOL/L — SIGNIFICANT CHANGE UP (ref 5–17)
BUN SERPL-MCNC: 29 MG/DL — HIGH (ref 7–23)
BUN SERPL-MCNC: 32 MG/DL — HIGH (ref 7–23)
CALCIUM SERPL-MCNC: 9.2 MG/DL — SIGNIFICANT CHANGE UP (ref 8.4–10.5)
CALCIUM SERPL-MCNC: 9.3 MG/DL — SIGNIFICANT CHANGE UP (ref 8.4–10.5)
CHLORIDE SERPL-SCNC: 100 MMOL/L — SIGNIFICANT CHANGE UP (ref 96–108)
CHLORIDE SERPL-SCNC: 102 MMOL/L — SIGNIFICANT CHANGE UP (ref 96–108)
CO2 SERPL-SCNC: 24 MMOL/L — SIGNIFICANT CHANGE UP (ref 22–31)
CO2 SERPL-SCNC: 25 MMOL/L — SIGNIFICANT CHANGE UP (ref 22–31)
CREAT SERPL-MCNC: 1.55 MG/DL — HIGH (ref 0.5–1.3)
CREAT SERPL-MCNC: 1.61 MG/DL — HIGH (ref 0.5–1.3)
EGFR: 44 ML/MIN/1.73M2 — LOW
EGFR: 46 ML/MIN/1.73M2 — LOW
GLUCOSE BLDC GLUCOMTR-MCNC: 233 MG/DL — HIGH (ref 70–99)
GLUCOSE SERPL-MCNC: 235 MG/DL — HIGH (ref 70–99)
GLUCOSE SERPL-MCNC: 310 MG/DL — HIGH (ref 70–99)
HCT VFR BLD CALC: 34.4 % — LOW (ref 39–50)
HGB BLD-MCNC: 12.2 G/DL — LOW (ref 13–17)
MAGNESIUM SERPL-MCNC: 1.6 MG/DL — SIGNIFICANT CHANGE UP (ref 1.6–2.6)
MCHC RBC-ENTMCNC: 31.8 PG — SIGNIFICANT CHANGE UP (ref 27–34)
MCHC RBC-ENTMCNC: 35.5 GM/DL — SIGNIFICANT CHANGE UP (ref 32–36)
MCV RBC AUTO: 89.6 FL — SIGNIFICANT CHANGE UP (ref 80–100)
NRBC # BLD: 0 /100 WBCS — SIGNIFICANT CHANGE UP (ref 0–0)
PHOSPHATE SERPL-MCNC: 3.8 MG/DL — SIGNIFICANT CHANGE UP (ref 2.5–4.5)
PLATELET # BLD AUTO: 143 K/UL — LOW (ref 150–400)
POTASSIUM SERPL-MCNC: 3.2 MMOL/L — LOW (ref 3.5–5.3)
POTASSIUM SERPL-MCNC: 3.4 MMOL/L — LOW (ref 3.5–5.3)
POTASSIUM SERPL-SCNC: 3.2 MMOL/L — LOW (ref 3.5–5.3)
POTASSIUM SERPL-SCNC: 3.4 MMOL/L — LOW (ref 3.5–5.3)
RBC # BLD: 3.84 M/UL — LOW (ref 4.2–5.8)
RBC # FLD: 13.6 % — SIGNIFICANT CHANGE UP (ref 10.3–14.5)
SODIUM SERPL-SCNC: 138 MMOL/L — SIGNIFICANT CHANGE UP (ref 135–145)
SODIUM SERPL-SCNC: 138 MMOL/L — SIGNIFICANT CHANGE UP (ref 135–145)
T3 SERPL-MCNC: 114 NG/DL — SIGNIFICANT CHANGE UP (ref 80–200)
T4 AB SER-ACNC: 6.9 UG/DL — SIGNIFICANT CHANGE UP (ref 4.6–12)
WBC # BLD: 5.77 K/UL — SIGNIFICANT CHANGE UP (ref 3.8–10.5)
WBC # FLD AUTO: 5.77 K/UL — SIGNIFICANT CHANGE UP (ref 3.8–10.5)

## 2023-08-17 RX ORDER — METOPROLOL TARTRATE 50 MG
1 TABLET ORAL
Refills: 0 | DISCHARGE

## 2023-08-17 RX ORDER — METOPROLOL TARTRATE 50 MG
1 TABLET ORAL
Qty: 28 | Refills: 0
Start: 2023-08-17 | End: 2023-08-30

## 2023-08-17 RX ORDER — METOPROLOL TARTRATE 50 MG
25 TABLET ORAL EVERY 12 HOURS
Refills: 0 | Status: DISCONTINUED | OUTPATIENT
Start: 2023-08-17 | End: 2023-08-17

## 2023-08-17 RX ORDER — POTASSIUM CHLORIDE 20 MEQ
20 PACKET (EA) ORAL
Refills: 0 | Status: COMPLETED | OUTPATIENT
Start: 2023-08-17 | End: 2023-08-17

## 2023-08-17 RX ADMIN — Medication 25 MILLIGRAM(S): at 10:22

## 2023-08-17 RX ADMIN — Medication 20 MILLIEQUIVALENT(S): at 07:52

## 2023-08-17 RX ADMIN — Medication 81 MILLIGRAM(S): at 07:54

## 2023-08-17 RX ADMIN — Medication 20 MILLIEQUIVALENT(S): at 10:10

## 2023-08-17 RX ADMIN — Medication 2: at 07:51

## 2023-08-17 RX ADMIN — LOSARTAN POTASSIUM 100 MILLIGRAM(S): 100 TABLET, FILM COATED ORAL at 05:30

## 2023-08-17 NOTE — PROGRESS NOTE ADULT - SUBJECTIVE AND OBJECTIVE BOX
SAI MONTGOMERY  77y Male  MRN:[id number]    Patient is a 77y old  Male who presents with a chief complaint of chest pain  HPI:  77-year-old male with a history of hypertension, hyperlipidemia, insulin-dependent diabetes, prostate cancer currently on daily radiation here for chest discomfort.  Patient had chest discomfort this morning, nonradiating, nonpleuritic and nonexertional without diaphoresis, nausea or vomiting.  No fevers, shortness of breath, cough, congestion.  Patient has what he describes as "lethargy" since starting radiation which is to be expected for the patient given the medications he is on that is unchanged however patient does have peak decreased p.o. intake over the last 24 to 48 hours which patient reports is due to not wanting to urinate.  noted to have pause on tele in ER. pt denied any symptoms  (15 Aug 2023 20:50)      Patient seen and evaluated at bedside. No acute events overnight except as noted.    Interval HPI: no acute events on     PAST MEDICAL & SURGICAL HISTORY:  NIDDM (Non-Insulin Dependent Diabetes Mellitus)  now uses insulin      History of Melanoma      History of Renal Calculi      Hypertension      Kidney stone      IDDM (insulin dependent diabetes mellitus)      Nephrolithiasis  stent placement        Ureteral stenosis  with stent placement          REVIEW OF SYSTEMS:  as per hpi     VITALS:  Vital Signs Last 24 Hrs  T(C): 36.7 (16 Aug 2023 05:10), Max: 36.7 (15 Aug 2023 12:39)  T(F): 98.1 (16 Aug 2023 05:10), Max: 98.1 (15 Aug 2023 17:33)  HR: 77 (16 Aug 2023 05:10) (70 - 122)  BP: 136/88 (16 Aug 2023 05:10) (91/62 - 153/93)  BP(mean): --  RR: 18 (16 Aug 2023 05:10) (18 - 20)  SpO2: 98% (16 Aug 2023 05:10) (96% - 100%)    Parameters below as of 16 Aug 2023 05:10  Patient On (Oxygen Delivery Method): room air      CAPILLARY BLOOD GLUCOSE      POCT Blood Glucose.: 312 mg/dL (16 Aug 2023 11:16)  POCT Blood Glucose.: 159 mg/dL (16 Aug 2023 07:35)  POCT Blood Glucose.: 103 mg/dL (15 Aug 2023 23:03)    I&O's Summary      PHYSICAL EXAM:  GENERAL: NAD, well-developed  HEAD:  Atraumatic, Normocephalic  EYES: EOMI, PERRLA, conjunctiva and sclera clear  NECK: Supple, No JVD  CHEST/LUNG: Clear to auscultation bilaterally; No wheeze  HEART: S1, S2; No murmurs, rubs, or gallops  ABDOMEN: Soft, Nontender, Nondistended; Bowel sounds present  EXTREMITIES:  2+ Peripheral Pulses, No clubbing, cyanosis, or edema  PSYCH: Normal affect  NEUROLOGY: AAOX3; non-focal  SKIN: No rashes or lesions    Consultant(s) Notes Reviewed:  [x ] YES  [ ] NO  Care Discussed with Consultants/Other Providers [ x] YES  [ ] NO    MEDS:  MEDICATIONS  (STANDING):  aspirin enteric coated 81 milliGRAM(s) Oral daily  atorvastatin 40 milliGRAM(s) Oral at bedtime  dextrose 5%. 1000 milliLiter(s) (100 mL/Hr) IV Continuous <Continuous>  dextrose 5%. 1000 milliLiter(s) (50 mL/Hr) IV Continuous <Continuous>  dextrose 50% Injectable 25 Gram(s) IV Push once  dextrose 50% Injectable 12.5 Gram(s) IV Push once  dextrose 50% Injectable 25 Gram(s) IV Push once  enoxaparin Injectable 40 milliGRAM(s) SubCutaneous every 24 hours  glucagon  Injectable 1 milliGRAM(s) IntraMuscular once  insulin lispro (ADMELOG) corrective regimen sliding scale   SubCutaneous three times a day before meals  losartan 100 milliGRAM(s) Oral daily    MEDICATIONS  (PRN):  dextrose Oral Gel 15 Gram(s) Oral once PRN Blood Glucose LESS THAN 70 milliGRAM(s)/deciliter    ALLERGIES:  glipiZIDE (Unknown)      LABS:                        12.7   6.24  )-----------( 151      ( 16 Aug 2023 07:03 )             36.6     08-16    142  |  100  |  23  ----------------------------<  171<H>  3.9   |  25  |  1.28    Ca    9.8      16 Aug 2023 07:03    TPro  7.2  /  Alb  3.7  /  TBili  1.3<H>  /  DBili  x   /  AST  17  /  ALT  13  /  AlkPhos  115  08-16    PT/INR - ( 15 Aug 2023 14:31 )   PT: 12.6 sec;   INR: 1.15 ratio         PTT - ( 15 Aug 2023 14:31 )  PTT:31.1 sec      LIVER FUNCTIONS - ( 16 Aug 2023 07:03 )  Alb: 3.7 g/dL / Pro: 7.2 g/dL / ALK PHOS: 115 U/L / ALT: 13 U/L / AST: 17 U/L / GGT: x           Urinalysis Basic - ( 16 Aug 2023 07:03 )    Color: x / Appearance: x / SG: x / pH: x  Gluc: 171 mg/dL / Ketone: x  / Bili: x / Urobili: x   Blood: x / Protein: x / Nitrite: x   Leuk Esterase: x / RBC: x / WBC x   Sq Epi: x / Non Sq Epi: x / Bacteria: x      TSH: Thyroid Stimulating Hormone, Serum: 1.70 uIU/mL (08-15 @ 15:23)     
Subjective: Patient seen and examined. No new events except as noted.   feels ok   transient Atach episodes on Tele     REVIEW OF SYSTEMS:    CONSTITUTIONAL: + weakness, fevers or chills  EYES/ENT: No visual changes;  No vertigo or throat pain   NECK: No pain or stiffness  RESPIRATORY: No cough, wheezing, hemoptysis; No shortness of breath  CARDIOVASCULAR: No chest pain or palpitations  GASTROINTESTINAL: No abdominal or epigastric pain. No nausea, vomiting, or hematemesis; No diarrhea or constipation. No melena or hematochezia.  GENITOURINARY: No dysuria, frequency or hematuria  NEUROLOGICAL: No numbness or weakness  SKIN: No itching, burning, rashes, or lesions   All other review of systems is negative unless indicated above.    MEDICATIONS:  MEDICATIONS  (STANDING):  aspirin enteric coated 81 milliGRAM(s) Oral daily  atorvastatin 40 milliGRAM(s) Oral at bedtime  dextrose 5%. 1000 milliLiter(s) (100 mL/Hr) IV Continuous <Continuous>  dextrose 5%. 1000 milliLiter(s) (50 mL/Hr) IV Continuous <Continuous>  dextrose 50% Injectable 25 Gram(s) IV Push once  dextrose 50% Injectable 12.5 Gram(s) IV Push once  dextrose 50% Injectable 25 Gram(s) IV Push once  enoxaparin Injectable 40 milliGRAM(s) SubCutaneous every 24 hours  glucagon  Injectable 1 milliGRAM(s) IntraMuscular once  insulin lispro (ADMELOG) corrective regimen sliding scale   SubCutaneous three times a day before meals  losartan 100 milliGRAM(s) Oral daily  potassium chloride    Tablet ER 20 milliEquivalent(s) Oral every 2 hours      PHYSICAL EXAM:  T(C): 36.8 (08-17-23 @ 06:07), Max: 36.9 (08-16-23 @ 12:00)  HR: 66 (08-17-23 @ 06:07) (66 - 85)  BP: 120/67 (08-17-23 @ 06:07) (120/67 - 157/93)  RR: 18 (08-17-23 @ 06:07) (18 - 18)  SpO2: 98% (08-17-23 @ 06:07) (95% - 98%)  Wt(kg): --  I&O's Summary        Appearance: Normal	  HEENT:   Normal oral mucosa, PERRL, EOMI	  Lymphatic: No lymphadenopathy , no edema  Cardiovascular: Normal S1 S2, No JVD, No murmurs , Peripheral pulses palpable 2+ bilaterally  Respiratory: Lungs clear to auscultation, normal effort 	  Gastrointestinal:  Soft, Non-tender, + BS	  Skin: No rashes, No ecchymoses, No cyanosis, warm to touch  Musculoskeletal: Normal range of motion, normal strength  Psychiatry:  Mood & affect appropriate  Ext: No edema      LABS:    CARDIAC MARKERS:                                12.2   5.77  )-----------( 143      ( 17 Aug 2023 07:06 )             34.4     08-17    138  |  102  |  29<H>  ----------------------------<  235<H>  3.2<L>   |  25  |  1.55<H>    Ca    9.2      17 Aug 2023 07:03  Phos  3.8     08-17  Mg     1.6     08-17    TPro  7.2  /  Alb  3.7  /  TBili  1.3<H>  /  DBili  x   /  AST  17  /  ALT  13  /  AlkPhos  115  08-16    proBNP:   Lipid Profile:   HgA1c:   TSH:             TELEMETRY: SR Atach 3 seconds	    ECG:  	  RADIOLOGY:   DIAGNOSTIC TESTING:  [ ] Echocardiogram:  < from: TTE W or WO Ultrasound Enhancing Agent (08.16.23 @ 08:35) >    TRANSTHORACIC ECHOCARDIOGRAM REPORT  ________________________________________________________________________________                                      _______       Pt. Name:       SAI MONTGOMERY Study Date:    8/16/2023  MRN:            [id number]          YOB: 1945  Accession #:    [id number]         Age:           77 years  Account#:       [id number]      Gender:        M  Heart Rate:     80 bpm            Height:        75.00 in (190.50 cm)  Rhythm:         sinus rhythm     Weight:        228.00 lb (103.42 kg)  Blood Pressure: 136/88 mmHg       BSA/BMI:       2.32 m² / 28.50 kg/m²  ________________________________________________________________________________________  Referring Physician:    [id number] Daniel Abad  Interpreting Physician: Rory Avila  Primary Sonographer:    Keny Fry [facility]    CPT:                ECHO TTE WO CON COMP W DOPP - 97677.m;ECHO TTE WITH CON COMP                      W DOPP - .m;DEFINITY ECHO CONTRAST PER ML -                   .m;DEFINITY ECHO CONTRAST PER ML WASTED - .m  Indication(s):      Chest pain, unspecified - R07.9  Procedure:          Transthoracic echocardiogram with 2-D, M-mode and complete                      spectral and color flow Doppler.  Ordering Location:  [facility] ([place])  Admission Status:   Inpatient  Contrast Injection: Verbal consent was obtained for injection of Ultrasonic                      Enhancing Agent following a discussion of risks and                      benefits.                  Endocardial visualization enhanced with 4 ml of Definity                      Ultrasound enhancing agent (Lot#:[id number] Exp.Date:09/01/2024                      Discarded Dose:6ml).  UEA Reaction:       Patient had no adverse reaction after injection of                      Ultrasound Enhancing Agent.  Study Information:  Image quality for this study is less than ideal.    _______________________________________________________________________________________     CONCLUSIONS:      1. Normal left ventricular cavity size. Left ventricular systolic function is hyperdynamic. There are no regional wall motion abnormalities seen.    ________________________________________________________________________________________  FINDINGS:     Left Ventricle:  After obtaining consent, Definity ultrasound enhancing agent was given for enhanced left ventricular opacification and improved delineation of the left ventricular endocardial borders. Normal left ventricular cavity size. Left ventricular wall thickness is normal. Left ventricular systolic function is hyperdynamic with an ejection fraction visually estimated at 75%. There are no regional wall motion abnormalities seen. Impaired relaxation with normal filling pressure.     Right Ventricle:  Normal right ventricular cavity size, normal wall thickness and reduced right ventricular systolic function. Tricuspid annular plane systolic excursion (TAPSE) is 1.6 cm (normal >=1.7 cm).     Left Atrium:  The left atrium is normal in size with an indexed volume of 24.44 ml/m².     Right Atrium:  The right atrium is normal in size with an indexed volume of 10.78 ml/m².     Interatrial Septum:  There is no evidence of a patent foramen ovale with no shunt detected by color flow Doppler.     Aortic Valve:  There is mild calcification of the aortic valve leaflets.     Mitral Valve:  Structurally normal mitral valve with normal leaflet excursion.     Tricuspid Valve:  Structurally normal tricuspid valve with normal leaflet excursion.     Pulmonic Valve:  The pulmonic valve was not well visualized.     Aorta:  The aortic annulus and aortic root appear normal in size.     Pericardium:  No pericardial effusion seen.     Systemic Veins:  The inferior vena cava is dilated measuring 2.10 cmin diameter, (dilated >2.1cm) with abnormal inspiratory collapse (abnormal <50%) consistent with elevated right atrial pressure (~15, range 10-20mmHg).  ____________________________________________________________________  Quantitative Data:  Left Ventricle Measurements: (Indexed to BSA)     IVSd (2D):   1.2 cm  LVPWd (2D):  1.1 cm  LVIDd (2D):  5.6 cm  LVIDs (2D):  3.3 cm  LV Mass:     265 g  114.1 g/m²  Visualized LV EF%: 75%     MV E Vmax:    0.45 m/s  MV A Vmax:    0.80 m/s  MV E/A:       0.57  e' lateral:   11.10 cm/s  e' medial:    6.09 cm/s  E/e' lateral: 4.08  E/e' medial:  7.44  E/e' Average: 5.27    Aorta Measurements: (normal range) (Indexed to BSA)     Sinuses of Valsalva: 3.10 cm (3.1 - 3.7 cm)cm       Left Atrium Measurements: (Indexed to BSA)  LA Diam 2D: 3.90 cm    Right Ventricle Measurements: Right Atrial Measurements:     TAPSE:           1.6 cm       RA Vol:       25.00 ml  TV Janna. S':      11.20 cm/s   RA Vol Index: 10.78 ml/m²  RV Base (RVID1): 3.6 cm  RV Mid (RVID2):  2.7 cm       LVOT / RVOT/ Qp/Qs Data: (Indexed to BSA)  LVOT Diameter: 2.00 cm  LVOT Vmax:     0.84 m/s  LVOT VTI:      17.40 cm  LVOT SV:       54.7 ml  23.56 ml/m²    Mitral Valve Measurements:     MV E Vmax: 0.5 m/s  MV A Vmax: 0.8 m/s  MV E/A:  0.6       Tricuspid Valve Measurements:     RA Pressure: 15 mmHg    ________________________________________________________________________________________  Electronically signed on 8/16/2023 at 12:18:45 PM by Rory Avila         *** Final ***    < end of copied text >    [ ]  Catheterization:  [ ] Stress Test:    OTHER:

## 2023-08-17 NOTE — PROGRESS NOTE ADULT - ASSESSMENT
77 male h/o htn, dm, chol, prostate ca, ckd, here with chest pain    chest pain  acs ruled out with trop neg x2  cards consulted  tele  echo  stress test    cardiac pause  hold av blockers  tele  echo  cards f/u  check tsh    dm  iss  monitor fs    chol  cont statin    htn  cont losartn  hold av blockers  tele    prostate ca  currently receiving RT  outpt onc f/u     ckd  creat at baseline  monitor  avoid nephrotoxins    dvt ppx      Advanced care planning was discussed with patient and family.  Advanced care planning forms were reviewed and discussed as appropriate.  Differential diagnosis and plan of care discussed with patient after the evaluation.   Pain assessed and judicious use of narcotics when appropriate was discussed.  Importance of Fall prevention discussed.  Counseling on Smoking and Alcohol cessation was offered when appropriate.  Counseling on Diet, exercise, and medication compliance was done.       Approx 60 minutes spent.
77-year-old male well known to me from office with a history of hypertension, hyperlipidemia, insulin-dependent diabetes, prostate cancer currently on daily radiation here for chest discomfort.  Patient had chest discomfort this morning, nonradiating, nonpleuritic and nonexertional without diaphoresis, nausea or vomiting.  No fevers, shortness of breath, cough, congestion.  Patient has what he describes as "lethargy" since starting radiation which is to be expected for the patient given the medications he is on that is unchanged however patient does have peak decreased p.o. intake over the last 24 to 48 hours which patient reports is due to not wanting to urinate.  noted to have 5 second pause on tele in ER. pt denied any symptoms     He was completely asymptomatic   no dizziness or chest pain

## 2023-08-17 NOTE — DISCHARGE NOTE NURSING/CASE MANAGEMENT/SOCIAL WORK - PATIENT PORTAL LINK FT
You can access the FollowMyHealth Patient Portal offered by [facility] by registering at the following website: [email or web address]. By joining [facility]’s FollowMyHealth portal, you will also be able to view your health information using other applications (apps) compatible with our system.

## 2023-08-17 NOTE — PROVIDER CONTACT NOTE (OTHER) - ASSESSMENT
Pt. asymptomatic, awake during event, Vital Signs Stable, BP: 132/77, spo2 98, HR 85, tele: SB/NSR 50-80

## 2023-08-17 NOTE — DISCHARGE NOTE NURSING/CASE MANAGEMENT/SOCIAL WORK - NSDCPEFALRISK_GEN_ALL_CORE
For information on Fall & Injury Prevention, visit: [email or web address] OR  [email or web address] OR  https://www.cdc.gov/steadi/patient.html

## 2023-09-11 NOTE — ED PROVIDER NOTE - NS_EDPROVIDERDISPOUSERTYPE_ED_A_ED
Comprehensive Nutrition Assessment    Type and Reason for Visit: Reassess  Malnutrition Screening Tool: Malnutrition Screen  Have you recently lost weight without trying?: 2 to 13 pounds (1 point)  Have you been eating poorly because of a decreased appetite?: Yes (1 point)  Malnutrition Screening Tool Score: 2    Nutrition Recommendations/Plan:   Enteral Nutrition:   Enteral Access: PEG  Continue  Formula: Standard with Fiber (Jevity 1.5 Kilo)  Goal Rate: Continuous 60 ml/hr  Continue  Water flush  55 ml every hour  Modulars: None not indicated at this time   Enteral regimen at above goal to provide per 24 hours:  1980 calories, 84 grams protein and 2213 ml free fluid. Above regimen: Intended to meet macronutrient goals  Labs:   EN labs: BMP daily, Mg daily  x 1 week and Phos daily x 1 week d/t electrolyte abnormalities. POC Glucoses/SSI Not indicated  Nutrition Related Medication Management:  Electrolyte Replacement Protocol PRN Continue for Potassium, Phosphorus, and Magnesium  Thiamine 100 mg daily x 7 days (EOT 9/15)  Bowel Regimen Active prn  Meals and Snacks:  Continue current diet. NPO     Malnutrition Assessment:  Malnutrition Status: Insufficient data  11% wt loss since June based on IM office # and ENT office wt on day of admission 154#  Pt off unit at MRI, unable to complete NFPE 9/7  Working with OT 9/9     Nutrition Assessment:  Nutrition History: Brief hx per wife at bedside, unable to consume any significant amount of food secondary to increased salivation and dysphagia. Wt hx per EMR review as below. 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 Presented from outpt office s/p fall - plan for trach, PEG and port were pending outpt.   Admitted with sepsis, syncope and Attending Attestation (For Attendings USE Only)...

## 2023-11-13 PROCEDURE — 99285 EMERGENCY DEPT VISIT HI MDM: CPT

## 2023-11-13 PROCEDURE — 83735 ASSAY OF MAGNESIUM: CPT

## 2023-11-13 PROCEDURE — 85014 HEMATOCRIT: CPT

## 2023-11-13 PROCEDURE — 85025 COMPLETE CBC W/AUTO DIFF WBC: CPT

## 2023-11-13 PROCEDURE — 84443 ASSAY THYROID STIM HORMONE: CPT

## 2023-11-13 PROCEDURE — 83880 ASSAY OF NATRIURETIC PEPTIDE: CPT

## 2023-11-13 PROCEDURE — 84132 ASSAY OF SERUM POTASSIUM: CPT

## 2023-11-13 PROCEDURE — 36415 COLL VENOUS BLD VENIPUNCTURE: CPT

## 2023-11-13 PROCEDURE — 83036 HEMOGLOBIN GLYCOSYLATED A1C: CPT

## 2023-11-13 PROCEDURE — 84484 ASSAY OF TROPONIN QUANT: CPT

## 2023-11-13 PROCEDURE — 85610 PROTHROMBIN TIME: CPT

## 2023-11-13 PROCEDURE — 93306 TTE W/DOPPLER COMPLETE: CPT

## 2023-11-13 PROCEDURE — C8929: CPT

## 2023-11-13 PROCEDURE — 80053 COMPREHEN METABOLIC PANEL: CPT

## 2023-11-13 PROCEDURE — 84436 ASSAY OF TOTAL THYROXINE: CPT

## 2023-11-13 PROCEDURE — 84100 ASSAY OF PHOSPHORUS: CPT

## 2023-11-13 PROCEDURE — 83605 ASSAY OF LACTIC ACID: CPT

## 2023-11-13 PROCEDURE — 71046 X-RAY EXAM CHEST 2 VIEWS: CPT

## 2023-11-13 PROCEDURE — 85027 COMPLETE CBC AUTOMATED: CPT

## 2023-11-13 PROCEDURE — 82962 GLUCOSE BLOOD TEST: CPT

## 2023-11-13 PROCEDURE — 80048 BASIC METABOLIC PNL TOTAL CA: CPT

## 2023-11-13 PROCEDURE — 82435 ASSAY OF BLOOD CHLORIDE: CPT

## 2023-11-13 PROCEDURE — 84480 ASSAY TRIIODOTHYRONINE (T3): CPT

## 2023-11-13 PROCEDURE — 82803 BLOOD GASES ANY COMBINATION: CPT

## 2023-11-13 PROCEDURE — 85730 THROMBOPLASTIN TIME PARTIAL: CPT

## 2023-11-13 PROCEDURE — 86803 HEPATITIS C AB TEST: CPT

## 2023-11-13 PROCEDURE — 82947 ASSAY GLUCOSE BLOOD QUANT: CPT

## 2023-11-13 PROCEDURE — 85018 HEMOGLOBIN: CPT

## 2023-11-13 PROCEDURE — 82330 ASSAY OF CALCIUM: CPT

## 2023-11-13 PROCEDURE — 84295 ASSAY OF SERUM SODIUM: CPT

## 2024-01-13 NOTE — DISCHARGE NOTE ADULT - REASON FOR ADMISSION
0 (no pain/absence of nonverbal indicators of pain) flank pain 0 (no pain/absence of nonverbal indicators of pain) 0 (no pain/absence of nonverbal indicators of pain)

## 2024-02-16 NOTE — ED PROVIDER NOTE - OBJECTIVE STATEMENT
Mirlande Gonzalez, Attending Physician:77-year-old male with a history of hypertension, hyperlipidemia, insulin-dependent diabetes, prostate cancer currently on daily radiation here for chest discomfort.  Patient had chest discomfort this morning, nonradiating, nonpleuritic and nonexertional without diaphoresis, nausea or vomiting.  No fevers, shortness of breath, cough, congestion.  Patient has what he describes as "lethargy" since starting radiation which is to be expected for the patient given the medications he is on that is unchanged however patient does have peak decreased p.o. intake over the last 24 to 48 hours which patient reports is due to not wanting to urinate.
How Severe Is Your Acne?: moderate
Is This A New Presentation, Or A Follow-Up?: Acne
Additional Comments (Use Complete Sentences): Pt has tried Tretinoin 0.05% x 1 year with no improvement

## 2024-05-01 NOTE — H&P ADULT - NSVTERISKASSESS_GEN_ALL_CORE FT
Keeley Borges is a 83 year old female here for  Chief Complaint   Patient presents with    Follow-up     Other iron deficiency anemia     Denies latex allergy or sensitivity.    Medication verified, no changes.  PCP and Pharmacy verified.    Social History     Tobacco Use   Smoking Status Former   Smokeless Tobacco Never     Advance Directives Filed: No    ECOG:   ECOG [05/01/24 0916]   ECOG Performance Status 1       Vitals:    Visit Vitals  /80 (BP Location: RUE - Right upper extremity, Patient Position: Sitting, Cuff Size: Regular)   Pulse 76   Temp 97.9 °F (36.6 °C) (Oral)   Resp 18   Wt 65.2 kg (143 lb 11.8 oz)   SpO2 93%   BMI 28.07 kg/m²       These vital signs are:  Within defined parameters (Per Reference \"Defined Limits Hospital Outpatient Department (HOD)\")    Height: No.  Ht Readings from Last 1 Encounters:   03/19/24 5' (1.524 m)     Weight:Yes, shoes on.  Wt Readings from Last 3 Encounters:   05/01/24 65.2 kg (143 lb 11.8 oz)   03/19/24 64.4 kg (142 lb)   03/18/24 64.9 kg (143 lb)       BMI: Body mass index is 28.07 kg/m².    REVIEW OF SYSTEMS  GENERAL:  Patient denies headache, fevers, chills, night sweats, excessive fatigue, change in appetite, weight loss, dizziness appetite is good  ALLERGIC/IMMUNOLOGIC: Verified allergies: Yes  EYES:  Patient denies significant visual difficulties, double vision, blurred vision  ENT/MOUTH: Patient denies problems with hearing, sore throat, sinus drainage, mouth sores  ENDOCRINE:  Patient denies diabetes, thyroid disease, hormone replacement, hot flashes  HEMATOLOGIC/LYMPHATIC: Patient denies easy bruising, bleeding, tender lymph nodes, swollen lymph nodes  BREASTS: Patient denies abnormal masses of breast, nipple discharge, pain  RESPIRATORY:  Patient denies lung pain with breathing, cough, coughing up blood, shortness of breath  CARDIOVASCULAR:  Patient denies anginal chest pain, palpitations, shortness of breath when lying flat, peripheral  edema  GASTROINTESTINAL: Patient denies abdominal pain , nausea, vomiting, diarrhea, GI bleeding, constipation, change in bowel habits, heartburn, sensation of feeling full, difficulty swallowing  : Patient denies abnormal genital masses, blood in the urine, frequency, urgency, burning with urination, hesitancy, incontinence, vaginal bleeding, discharge  MUSCULOSKELETAL:  Patient denies joint pain, bone pain, joint swelling, redness, decreased range of motion  SKIN:  Patient denies chronic rashes, inflammation, ulcerations, skin changes, itching  NEUROLOGIC:  Patient denies loss of balance, areas of focal weakness, abnormal gait, sensory problems, numbness, tingling  PSYCHIATRIC: Patient denies insomnia, depression, anxiety    This patient reported abnormal symptoms that needed immediate verbal communication: No     Medical Assessment Completed on: 15-Aug-2023 20:52

## 2025-01-31 NOTE — ED PROVIDER NOTE - DATE/TIME 1
Copied from CRM #[id number]. Topic: MW Schedule Appointment - MW Schedule Primary Care  >> Jan 31, 2025 10:13 AM Roman PLAZA wrote:  Matt called requesting to schedule a primary care visit with a Clinician. Checked insurance.  Looked for any active requests, recalls, service to orders, scheduling tickets prior to scheduling. The decision tree [facility] Was used for scheduling (an)     Non-Acute need.  Scheduled and Patient added to waitlist. Read back appointment details. Appt is greater than 3 days, no further action is needed. Selected 'Wrap Up CRM' and chose appropriate 'Resolve' reason.-- DO NOT REPLY / DO NOT REPLY ALL --  -- This inbox is not monitored. If this was sent to the wrong provider or department, reroute message to P ECO Reroute pool. --  -- Message is from [employer] ([employer]) --    Order Request  Lab: Blood work    Message / reason: Wants to know if there is a flag on his PSA    Insurance type: [employer] PPO  No billing information found for this encounter.    Preferred Delivery Method  Input in Epic     Caller Information       Contact Date/Time Type Contact Phone/Fax    01/31/2025 10:12 AM CST Phone (Incoming) Matt [phone]            Alternative phone number: NA    Can a detailed message be left? No  Patient has been advised the message will be addressed within 2-3 business days.       31-Mar-2018 11:55

## 2025-07-29 ENCOUNTER — EMERGENCY (EMERGENCY)
Facility: HOSPITAL | Age: 80
LOS: 1 days | End: 2025-07-29
Attending: EMERGENCY MEDICINE
Payer: MEDICARE

## 2025-07-29 VITALS
HEIGHT: 75 IN | WEIGHT: 225.09 LBS | TEMPERATURE: 98 F | SYSTOLIC BLOOD PRESSURE: 194 MMHG | HEART RATE: 85 BPM | DIASTOLIC BLOOD PRESSURE: 62 MMHG | RESPIRATION RATE: 18 BRPM

## 2025-07-29 VITALS
RESPIRATION RATE: 19 BRPM | TEMPERATURE: 98 F | DIASTOLIC BLOOD PRESSURE: 82 MMHG | OXYGEN SATURATION: 98 % | SYSTOLIC BLOOD PRESSURE: 147 MMHG | HEART RATE: 76 BPM

## 2025-07-29 DIAGNOSIS — I10 ESSENTIAL (PRIMARY) HYPERTENSION: ICD-10-CM

## 2025-07-29 DIAGNOSIS — E78.5 HYPERLIPIDEMIA, UNSPECIFIED: ICD-10-CM

## 2025-07-29 DIAGNOSIS — E11.9 TYPE 2 DIABETES MELLITUS WITHOUT COMPLICATIONS: ICD-10-CM

## 2025-07-29 DIAGNOSIS — R07.9 CHEST PAIN, UNSPECIFIED: ICD-10-CM

## 2025-07-29 DIAGNOSIS — Q62.10 CONGENITAL OCCLUSION OF URETER, UNSPECIFIED: Chronic | ICD-10-CM

## 2025-07-29 LAB
ALBUMIN SERPL ELPH-MCNC: 4.2 G/DL — SIGNIFICANT CHANGE UP (ref 3.3–5)
ALP SERPL-CCNC: 89 U/L — SIGNIFICANT CHANGE UP (ref 40–120)
ALT FLD-CCNC: 11 U/L — SIGNIFICANT CHANGE UP (ref 10–45)
ANION GAP SERPL CALC-SCNC: 13 MMOL/L — SIGNIFICANT CHANGE UP (ref 5–17)
APTT BLD: 30.7 SEC — SIGNIFICANT CHANGE UP (ref 26.1–36.8)
AST SERPL-CCNC: 10 U/L — SIGNIFICANT CHANGE UP (ref 10–40)
BASOPHILS # BLD AUTO: 0.03 K/UL — SIGNIFICANT CHANGE UP (ref 0–0.2)
BASOPHILS NFR BLD AUTO: 0.4 % — SIGNIFICANT CHANGE UP (ref 0–2)
BILIRUB SERPL-MCNC: 1.1 MG/DL — SIGNIFICANT CHANGE UP (ref 0.2–1.2)
BUN SERPL-MCNC: 26 MG/DL — HIGH (ref 7–23)
CALCIUM SERPL-MCNC: 9.6 MG/DL — SIGNIFICANT CHANGE UP (ref 8.4–10.5)
CHLORIDE SERPL-SCNC: 103 MMOL/L — SIGNIFICANT CHANGE UP (ref 96–108)
CO2 SERPL-SCNC: 22 MMOL/L — SIGNIFICANT CHANGE UP (ref 22–31)
CREAT SERPL-MCNC: 1.48 MG/DL — HIGH (ref 0.5–1.3)
EGFR: 48 ML/MIN/1.73M2 — LOW
EGFR: 48 ML/MIN/1.73M2 — LOW
EOSINOPHIL # BLD AUTO: 0.03 K/UL — SIGNIFICANT CHANGE UP (ref 0–0.5)
EOSINOPHIL NFR BLD AUTO: 0.4 % — SIGNIFICANT CHANGE UP (ref 0–6)
GLUCOSE SERPL-MCNC: 168 MG/DL — HIGH (ref 70–99)
HCT VFR BLD CALC: 43.4 % — SIGNIFICANT CHANGE UP (ref 39–50)
HGB BLD-MCNC: 14.8 G/DL — SIGNIFICANT CHANGE UP (ref 13–17)
IMM GRANULOCYTES # BLD AUTO: 0.02 K/UL — SIGNIFICANT CHANGE UP (ref 0–0.07)
IMM GRANULOCYTES NFR BLD AUTO: 0.3 % — SIGNIFICANT CHANGE UP (ref 0–0.9)
INR BLD: 1.04 RATIO — SIGNIFICANT CHANGE UP (ref 0.85–1.16)
LIDOCAIN IGE QN: 27 U/L — SIGNIFICANT CHANGE UP (ref 7–60)
LYMPHOCYTES # BLD AUTO: 0.85 K/UL — LOW (ref 1–3.3)
LYMPHOCYTES NFR BLD AUTO: 12.7 % — LOW (ref 13–44)
MAGNESIUM SERPL-MCNC: 2.1 MG/DL — SIGNIFICANT CHANGE UP (ref 1.6–2.6)
MCHC RBC-ENTMCNC: 30.4 PG — SIGNIFICANT CHANGE UP (ref 27–34)
MCHC RBC-ENTMCNC: 34.1 G/DL — SIGNIFICANT CHANGE UP (ref 32–36)
MCV RBC AUTO: 89.1 FL — SIGNIFICANT CHANGE UP (ref 80–100)
MONOCYTES # BLD AUTO: 0.39 K/UL — SIGNIFICANT CHANGE UP (ref 0–0.9)
MONOCYTES NFR BLD AUTO: 5.8 % — SIGNIFICANT CHANGE UP (ref 2–14)
NEUTROPHILS # BLD AUTO: 5.39 K/UL — SIGNIFICANT CHANGE UP (ref 1.8–7.4)
NEUTROPHILS NFR BLD AUTO: 80.4 % — HIGH (ref 43–77)
NRBC # BLD AUTO: 0 K/UL — SIGNIFICANT CHANGE UP (ref 0–0)
NRBC # FLD: 0 K/UL — SIGNIFICANT CHANGE UP (ref 0–0)
NRBC BLD AUTO-RTO: 0 /100 WBCS — SIGNIFICANT CHANGE UP (ref 0–0)
PLATELET # BLD AUTO: 139 K/UL — LOW (ref 150–400)
PMV BLD: 11.8 FL — SIGNIFICANT CHANGE UP (ref 7–13)
POTASSIUM SERPL-MCNC: 4.1 MMOL/L — SIGNIFICANT CHANGE UP (ref 3.5–5.3)
POTASSIUM SERPL-SCNC: 4.1 MMOL/L — SIGNIFICANT CHANGE UP (ref 3.5–5.3)
PROT SERPL-MCNC: 7.2 G/DL — SIGNIFICANT CHANGE UP (ref 6–8.3)
PROTHROM AB SERPL-ACNC: 12 SEC — SIGNIFICANT CHANGE UP (ref 9.9–13.4)
RBC # BLD: 4.87 M/UL — SIGNIFICANT CHANGE UP (ref 4.2–5.8)
RBC # FLD: 12.5 % — SIGNIFICANT CHANGE UP (ref 10.3–14.5)
SODIUM SERPL-SCNC: 138 MMOL/L — SIGNIFICANT CHANGE UP (ref 135–145)
TROPONIN T, HIGH SENSITIVITY RESULT: 51 NG/L — SIGNIFICANT CHANGE UP (ref 0–51)
TROPONIN T, HIGH SENSITIVITY RESULT: 54 NG/L — HIGH (ref 0–51)
TSH SERPL-MCNC: 2.87 UIU/ML — SIGNIFICANT CHANGE UP (ref 0.27–4.2)
WBC # BLD: 6.71 K/UL — SIGNIFICANT CHANGE UP (ref 3.8–10.5)
WBC # FLD AUTO: 6.71 K/UL — SIGNIFICANT CHANGE UP (ref 3.8–10.5)

## 2025-07-29 PROCEDURE — 83690 ASSAY OF LIPASE: CPT

## 2025-07-29 PROCEDURE — 85025 COMPLETE CBC W/AUTO DIFF WBC: CPT

## 2025-07-29 PROCEDURE — 93308 TTE F-UP OR LMTD: CPT | Mod: 26

## 2025-07-29 PROCEDURE — 93005 ELECTROCARDIOGRAM TRACING: CPT

## 2025-07-29 PROCEDURE — 83735 ASSAY OF MAGNESIUM: CPT

## 2025-07-29 PROCEDURE — 80053 COMPREHEN METABOLIC PANEL: CPT

## 2025-07-29 PROCEDURE — 93308 TTE F-UP OR LMTD: CPT

## 2025-07-29 PROCEDURE — 93010 ELECTROCARDIOGRAM REPORT: CPT

## 2025-07-29 PROCEDURE — 99285 EMERGENCY DEPT VISIT HI MDM: CPT | Mod: GC

## 2025-07-29 PROCEDURE — 82962 GLUCOSE BLOOD TEST: CPT

## 2025-07-29 PROCEDURE — 71046 X-RAY EXAM CHEST 2 VIEWS: CPT

## 2025-07-29 PROCEDURE — 85610 PROTHROMBIN TIME: CPT

## 2025-07-29 PROCEDURE — 84484 ASSAY OF TROPONIN QUANT: CPT

## 2025-07-29 PROCEDURE — 36415 COLL VENOUS BLD VENIPUNCTURE: CPT

## 2025-07-29 PROCEDURE — 99285 EMERGENCY DEPT VISIT HI MDM: CPT | Mod: 25

## 2025-07-29 PROCEDURE — 71046 X-RAY EXAM CHEST 2 VIEWS: CPT | Mod: 26

## 2025-07-29 PROCEDURE — 96374 THER/PROPH/DIAG INJ IV PUSH: CPT

## 2025-07-29 PROCEDURE — 85730 THROMBOPLASTIN TIME PARTIAL: CPT

## 2025-07-29 PROCEDURE — 84443 ASSAY THYROID STIM HORMONE: CPT

## 2025-07-29 RX ADMIN — Medication 20 MILLIGRAM(S): at 10:12

## 2025-07-29 NOTE — ED PROVIDER NOTE - OBJECTIVE STATEMENT
77-year-old male with past medical history hypertension, hyperlipidemia, insulin dependent diabetes mellitus, prostate cancer in remission past radiation therapy 2 years ago, hernias, melanoma in remission, cervical spinal fusion with residual left arm numbness/weakness, presents today for 2 days worth of intermittent chest pressure.  Points to his upper abdomen as the area of the chest pressure, radiates bilateral only anterior does not go to the back nor to the shoulders.  Notes that at rest when he is in bed he does not feel it although when he undergoes his activities of daily living he may feel the pain more.  May be worsened after meals, he does endorse a globus sensation but denies any burping or hiccups.  Last stress test and echo was over 2 years ago, follows with cardiologist Dr. Gutierrez.  Otherwise denies any fever, chills, night sweats, pleuritic chest pain, shortness of breath, hemoptysis, nausea, vomiting, diarrhea, constipation, PND, orthopnea, pedal edema.

## 2025-07-29 NOTE — ED PROVIDER NOTE - PHYSICAL EXAMINATION
Jareth Aldridge DO (PGY2)   Physical Exam:    Gen: NAD, AOx3  Head: NCAT.  Right-sided melanoma removal postsurgical scar noted, chronic appearing  HEENT: EOMI, PEERLA, pink and moist mucous membranes  Lung: CTAB, no respiratory distress, no wheezes/rhonchi/rales B/L  CV: RRR  Abd: soft, NT, ND, no guarding, no rigidity, no rebound tenderness, no CVA tenderness.  No epigastric tenderness to palpation  MSK: no visible deformities, ROM normal in UE/LE  Neuro: No gross neurologic deficits  Skin: Warm, well perfused, no rash, no leg swelling  Psych: normal affect, calm

## 2025-07-29 NOTE — ED PROVIDER NOTE - PROGRESS NOTE DETAILS
spoke to cards about this patient, will see Cleared by cardiology, good outpatient follow-up. Cleared by cardiology, o/p f/u already arranged

## 2025-07-29 NOTE — CONSULT NOTE ADULT - SUBJECTIVE AND OBJECTIVE BOX
CHIEF COMPLAINT:    HISTORY OF PRESENT ILLNESS:  77-year-old male with past medical history hypertension, hyperlipidemia, insulin dependent diabetes mellitus, prostate cancer in remission past radiation therapy 2 years ago, hernias, melanoma in remission, cervical spinal fusion with residual left arm numbness/weakness, presents today for 2 days worth of intermittent chest pressure.  Points to his upper abdomen as the area of the chest pressure, radiates bilateral only anterior does not go to the back nor to the shoulders.  Notes that at rest when he is in bed he does not feel it although when he undergoes his activities of daily living he may feel the pain more.  May be worsened after meals, he does endorse a globus sensation but denies any burping or hiccups.  Last stress test and echo was over 2 years ago, follows with cardiologist Dr. Gutierrez.  Otherwise denies any fever, chills, night sweats, pleuritic chest pain, shortness of breath, hemoptysis, nausea, vomiting, diarrhea, constipation, PND, orthopnea, pedal edema.    PAST MEDICAL & SURGICAL HISTORY:  NIDDM (Non-Insulin Dependent Diabetes Mellitus)  now uses insulin      History of Melanoma      History of Renal Calculi      Hypertension      Kidney stone      IDDM (insulin dependent diabetes mellitus)      Nephrolithiasis  stent placement        Ureteral stenosis  with stent placement              MEDICATIONS:                  FAMILY HISTORY:      SOCIAL HISTORY:    [ ] Non-smoker  [ ] Smoker  [ ] Alcohol    Allergies    glipiZIDE (Unknown)    Intolerances    	    REVIEW OF SYSTEMS:  CONSTITUTIONAL: No fever, weight loss, or fatigue  EYES: No eye pain, visual disturbances, or discharge  ENMT:  No difficulty hearing, tinnitus, vertigo; No sinus or throat pain  NECK: No pain or stiffness  RESPIRATORY: No cough, wheezing, chills or hemoptysis; No Shortness of Breath  CARDIOVASCULAR: No chest pain, palpitations, passing out, dizziness, or leg swelling  GASTROINTESTINAL: No abdominal or epigastric pain. No nausea, vomiting, or hematemesis; No diarrhea or constipation. No melena or hematochezia.  GENITOURINARY: No dysuria, frequency, hematuria, or incontinence  NEUROLOGICAL: No headaches, memory loss, loss of strength, numbness, or tremors  SKIN: No itching, burning, rashes, or lesions   LYMPH Nodes: No enlarged glands  ENDOCRINE: No heat or cold intolerance; No hair loss  MUSCULOSKELETAL: No joint pain or swelling; No muscle, back, or extremity pain  PSYCHIATRIC: No depression, anxiety, mood swings, or difficulty sleeping  HEME/LYMPH: No easy bruising, or bleeding gums  ALLERY AND IMMUNOLOGIC: No hives or eczema	    [ ] All others negative	  [ ] Unable to obtain    PHYSICAL EXAM:  T(C): 36.9 (07-29-25 @ 11:19), Max: 36.9 (07-29-25 @ 09:53)  HR: 77 (07-29-25 @ 11:19) (76 - 85)  BP: 158/82 (07-29-25 @ 11:19) (158/82 - 194/62)  RR: 18 (07-29-25 @ 11:19) (18 - 18)  SpO2: 97% (07-29-25 @ 11:19) (97% - 98%)  Wt(kg): --  I&O's Summary      Appearance: Normal	  HEENT:   Normal oral mucosa, PERRL, EOMI	  Lymphatic: No lymphadenopathy  Cardiovascular: Normal S1 S2, No JVD, No murmurs, No edema  Respiratory: Lungs clear to auscultation	  Psychiatry: A & O x 3, Mood & affect appropriate  Gastrointestinal:  Soft, Non-tender, + BS	  Skin: No rashes, No ecchymoses, No cyanosis	  Neurologic: Non-focal  Extremities: Normal range of motion, No clubbing, cyanosis or edema  Vascular: Peripheral pulses palpable 2+ bilaterally    TELEMETRY: 	    ECG:  	NSR rate 75, no st e/d, no changes from prior EKG 2023, motion artifact  RADIOLOGY:  < from: POCUS ED TTE 2D F/U, Limited w/o Cont. (07.29.25 @ 10:55) >    Procedure was performed in the Emergency Department by a credentialed   Emergency Medicine Attending Physician    EXAM:  ER TTE LIMITED      ORDER COMMENTS:      PROCEDURE DATE:  07/29/2025    FOCUSED ED ULTRASOUND REPORT          INTERPRETATION:  A focused transthoracic cardiac ultrasound examination   was performed.  No pericardial effusion was present.  No global wall motion abnormality was identified/  Preserved ejection fraction  A line predominant lung fields    Left renal cyst    IMPRESSION:  No Pericardial Effusion.  Preserved ejection fraction    --- End of Report ---            < end of copied text >  OTHER: 	  	  LABS:	 	    CARDIAC MARKERS:                                  14.8   6.71  )-----------( 139      ( 29 Jul 2025 10:20 )             43.4     07-29    138  |  103  |  26[H]  ----------------------------<  168[H]  4.1   |  22  |  1.48[H]    Ca    9.6      29 Jul 2025 10:20  Mg     2.1     07-29    TPro  7.2  /  Alb  4.2  /  TBili  1.1  /  DBili  x   /  AST  10  /  ALT  11  /  AlkPhos  89  07-29    proBNP:   Lipid Profile:   HgA1c:   TSH:

## 2025-07-29 NOTE — ED PROVIDER NOTE - NSFOLLOWUPINSTRUCTIONS_ED_ALL_ED_FT
- You were seen in the emergency department today for   Chest pain.  Your labs and imaging did not have any concerning findings here and cardiology evaluated you here and cleared you from their perspective.  Please follow-up outpatient for consideration of additional testing as discussed with you.  Please follow-up with your regular cardiologist.    - Lab and imaging results, if performed, were discussed with you along with your discharge diagnosis    - Follow up with your doctor in 1 week - bring copies of your results if you were given. If you are supposed  to follow up with a specialist, please bring your results with you as well.     - Return to the ED for any new, worsening, or concerning symptoms to you    - Continue all prescribed medications.    - Take Tylenol as directed as needed for pain.     - Rest and keep yourself hydrated with fluids.      Chest Pain    Chest pain can be caused by many different conditions which may or may not be dangerous. Causes include heartburn, lung infections, heart attack, blood clot in lungs, skin infections, strain or damage to muscle, cartilage, or bones, etc. In addition to a history and physical examination, an electrocardiogram (ECG) or other lab tests may have been performed to determine the cause of your chest pain. Follow up with your primary care provider or with a cardiologist as instructed.     SEEK IMMEDIATE MEDICAL CARE IF YOU HAVE ANY OF THE FOLLOWING SYMPTOMS: worsening chest pain, coughing up blood, unexplained back/neck/jaw pain, severe abdominal pain, dizziness or lightheadedness, fainting, shortness of breath, sweaty or clammy skin, vomiting, or racing heart beat. These symptoms may represent a serious problem that is an emergency. Do not wait to see if the symptoms will go away. Get medical help right away. Call 911 and do not drive yourself to the hospital.

## 2025-07-29 NOTE — ED PROVIDER NOTE - PATIENT PORTAL LINK FT
You can access the FollowMyHealth Patient Portal offered by [facility] by registering at the following website: [email or web address]. By joining [facility]’s FollowMyHealth portal, you will also be able to view your health information using other applications (apps) compatible with our system.

## 2025-07-29 NOTE — ED PROVIDER NOTE - CLINICAL SUMMARY MEDICAL DECISION MAKING FREE TEXT BOX
77-year-old male with past medical history hypertension, hyperlipidemia, insulin dependent diabetes mellitus, prostate cancer in remission past radiation therapy 2 years ago, hernias, melanoma in remission, cervical spinal fusion with residual left arm numbness/weakness, presents today for 2 days worth of intermittent chest pressure.  Physical examination unremarkable as above, well-appearing patient not actively endorsing any chest pain.  Differentials include ACS, dysrhythmia, electrolyte abnormality, acid reflux, GERD, no suspicion for any pulmonary embolism at this time, no hemoptysis not tachycardic, saturating well on room air.  EKG with PACs similar to prior, no acute ST or T wave ischemic changes noted, baseline artifact.  Plan includes labs and imaging for the above, symptomatic management as necessary, cardiac monitoring, may be a CDU candidate for consideration of stress and echo if labs and imaging are otherwise unremarkable.

## 2025-07-29 NOTE — ED ADULT NURSE NOTE - OBJECTIVE STATEMENT
79 y.o M A&Ox4 w. PMH of prostate cancer, DM presents to ED complaining of epigastric pain. Pt states that pain started yesterday and is not necessarily worsened by eating. Pt describes as it a pain in his upper abdomen and describes it as a "dull tightness." Pt denies pain radiating to the back, SOB. VSS at this time

## 2025-07-29 NOTE — ED PROVIDER NOTE - ATTENDING CONTRIBUTION TO CARE
Plan:  - EKG: NSR rate 75, no st e/d, no changes from prior EKG 2023, motion artifact Patient with chest pain x2 days, more epigastric with fullness on swallowing, no sob, no cough, hemoptysis, signs of fluid overload, no nausea, vomiting.  Last echo was 2 years ago.  Clinically no concern for PE.      Plan:  - EKG: NSR rate 75, no st e/d, no changes from prior EKG 2023, motion artifact  - Labs, trop, lipase  - Will likely admit to CDU for echo